# Patient Record
Sex: FEMALE | Race: WHITE | NOT HISPANIC OR LATINO | Employment: FULL TIME | URBAN - METROPOLITAN AREA
[De-identification: names, ages, dates, MRNs, and addresses within clinical notes are randomized per-mention and may not be internally consistent; named-entity substitution may affect disease eponyms.]

---
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
## 2024-01-29 ENCOUNTER — HOSPITAL ENCOUNTER (EMERGENCY)
Facility: MEDICAL CENTER | Age: 19
End: 2024-01-29
Attending: STUDENT IN AN ORGANIZED HEALTH CARE EDUCATION/TRAINING PROGRAM

## 2024-01-29 VITALS
BODY MASS INDEX: 18.11 KG/M2 | SYSTOLIC BLOOD PRESSURE: 102 MMHG | DIASTOLIC BLOOD PRESSURE: 76 MMHG | WEIGHT: 102.2 LBS | TEMPERATURE: 98.4 F | HEART RATE: 87 BPM | RESPIRATION RATE: 16 BRPM | OXYGEN SATURATION: 100 % | HEIGHT: 63 IN

## 2024-01-29 DIAGNOSIS — N30.90 CYSTITIS: ICD-10-CM

## 2024-01-29 LAB
AMORPH CRY #/AREA URNS HPF: PRESENT /HPF
APPEARANCE UR: CLEAR
BACTERIA #/AREA URNS HPF: ABNORMAL /HPF
BILIRUB UR QL STRIP.AUTO: NEGATIVE
COLOR UR: YELLOW
EPI CELLS #/AREA URNS HPF: ABNORMAL /HPF
GLUCOSE UR STRIP.AUTO-MCNC: NEGATIVE MG/DL
HCG UR QL: NEGATIVE
KETONES UR STRIP.AUTO-MCNC: 15 MG/DL
LEUKOCYTE ESTERASE UR QL STRIP.AUTO: NEGATIVE
MICRO URNS: ABNORMAL
NITRITE UR QL STRIP.AUTO: NEGATIVE
PH UR STRIP.AUTO: 7 [PH] (ref 5–8)
PROT UR QL STRIP: NEGATIVE MG/DL
RBC # URNS HPF: ABNORMAL /HPF
RBC UR QL AUTO: ABNORMAL
SP GR UR STRIP.AUTO: 1.01
WBC #/AREA URNS HPF: ABNORMAL /HPF

## 2024-01-29 PROCEDURE — 81025 URINE PREGNANCY TEST: CPT

## 2024-01-29 PROCEDURE — 99283 EMERGENCY DEPT VISIT LOW MDM: CPT

## 2024-01-29 PROCEDURE — 87086 URINE CULTURE/COLONY COUNT: CPT

## 2024-01-29 PROCEDURE — 81001 URINALYSIS AUTO W/SCOPE: CPT

## 2024-01-29 PROCEDURE — 700102 HCHG RX REV CODE 250 W/ 637 OVERRIDE(OP): Performed by: STUDENT IN AN ORGANIZED HEALTH CARE EDUCATION/TRAINING PROGRAM

## 2024-01-29 PROCEDURE — A9270 NON-COVERED ITEM OR SERVICE: HCPCS | Performed by: STUDENT IN AN ORGANIZED HEALTH CARE EDUCATION/TRAINING PROGRAM

## 2024-01-29 RX ORDER — SULFAMETHOXAZOLE AND TRIMETHOPRIM 800; 160 MG/1; MG/1
1 TABLET ORAL 2 TIMES DAILY
Qty: 6 TABLET | Refills: 0 | Status: ACTIVE | OUTPATIENT
Start: 2024-01-29 | End: 2024-02-01

## 2024-01-29 RX ORDER — PHENAZOPYRIDINE HYDROCHLORIDE 200 MG/1
200 TABLET, FILM COATED ORAL 3 TIMES DAILY PRN
Qty: 6 TABLET | Refills: 0 | Status: SHIPPED | OUTPATIENT
Start: 2024-01-29

## 2024-01-29 RX ORDER — SULFAMETHOXAZOLE AND TRIMETHOPRIM 800; 160 MG/1; MG/1
1 TABLET ORAL ONCE
Status: COMPLETED | OUTPATIENT
Start: 2024-01-29 | End: 2024-01-29

## 2024-01-29 RX ADMIN — SULFAMETHOXAZOLE AND TRIMETHOPRIM 1 TABLET: 800; 160 TABLET ORAL at 21:17

## 2024-01-29 ASSESSMENT — FIBROSIS 4 INDEX: FIB4 SCORE: 0.29

## 2024-01-30 NOTE — ED TRIAGE NOTES
"Chief Complaint   Patient presents with    Painful Urination     Onset this morning, pt also noted blood in urine today. Denies any fever, N/V. Reports right flank pain 4 days ago but this has since resolved.      /65   Pulse 94   Temp 36.9 °C (98.4 °F) (Temporal)   Resp 16   Ht 1.6 m (5' 3\")   Wt 46.4 kg (102 lb 3.2 oz)   LMP 01/08/2024 (Within Weeks)   SpO2 96%   BMI 18.10 kg/m²     Pt AO4, amb w steady gait.   "

## 2024-01-30 NOTE — DISCHARGE INSTRUCTIONS
Take the antibiotics until they are gone, may use the Pyridium as needed for increasing urinary frequency, or bladder discomfort, though this may change her urine orange this is normal and will return to normal after a few days.  If you develop any fevers, flank pain uncontrolled vomiting return for recheck

## 2024-01-30 NOTE — ED NOTES
PT provided with DC paper work and follow up care. Pt declines questions about new mediations. Pt to ambulate out of ER.

## 2024-01-30 NOTE — ED PROVIDER NOTES
CHIEF COMPLAINT  Chief Complaint   Patient presents with    Painful Urination     Onset this morning, pt also noted blood in urine today. Denies any fever, N/V. Reports right flank pain 4 days ago but this has since resolved.        LIMITATION TO HISTORY   Select: None    NIMCO Spencer is a 18 y.o. female who presents to the Emergency Department presents for evaluation of 1 week of increasing urinary frequency urgency with noted hematuria beginning today.  No fevers nausea vomiting did have a right flank pain about 4 days ago but this is since resolved.  He is complaining of suprapubic fullness, though no specific abdominal or flank pain    OUTSIDE HISTORIAN(S):  Select: None    EXTERNAL RECORDS REVIEWED  Select: Other ED note from Jose Antonio Mccartney 7/7/2023, patient was evaluated for the fever and headache, does intermittently use marijuana, is otherwise usual and healthy      PAST MEDICAL HISTORY  History reviewed. No pertinent past medical history.  .    SURGICAL HISTORY  History reviewed. No pertinent surgical history.      FAMILY HISTORY  History reviewed. No pertinent family history.       SOCIAL HISTORY  Social History     Socioeconomic History    Marital status: Single     Spouse name: Not on file    Number of children: Not on file    Years of education: Not on file    Highest education level: Not on file   Occupational History    Not on file   Tobacco Use    Smoking status: Never     Passive exposure: Yes    Smokeless tobacco: Never   Vaping Use    Vaping Use: Every day    Substances: Nicotine    Devices: Pre-filled or refillable cartridge   Substance and Sexual Activity    Alcohol use: Not Currently    Drug use: Not Currently    Sexual activity: Not on file   Other Topics Concern    Not on file   Social History Narrative    Not on file     Social Determinants of Health     Financial Resource Strain: Not on file   Food Insecurity: Not on file   Transportation Needs: Not on file   Physical Activity: Not on  "file   Stress: Not on file   Social Connections: Not on file   Intimate Partner Violence: Not on file   Housing Stability: Not on file         CURRENT MEDICATIONS  No current facility-administered medications on file prior to encounter.     Current Outpatient Medications on File Prior to Encounter   Medication Sig Dispense Refill    busPIRone (BUSPAR) 10 MG Tab tablet Take 10 mg by mouth 2 times a day.      ondansetron (ZOFRAN ODT) 4 MG TABLET DISPERSIBLE Take 1 Tablet by mouth every 8 hours as needed for Nausea. 10 Tablet 1    naproxen (NAPROSYN) 500 MG Tab Take 1 Tablet by mouth 2 times daily with meals as needed (pain). 20 Tablet 0    Aspirin-Acetaminophen-Caffeine (EXCEDRIN PO) Take  by mouth.      Pseudoeph-Doxylamine-DM-APAP (NYQUIL PO) Take  by mouth.             ALLERGIES  No Known Allergies    PHYSICAL EXAM  VITAL SIGNS:/76   Pulse 87   Temp 36.9 °C (98.4 °F) (Temporal)   Resp 16   Ht 1.6 m (5' 3\")   Wt 46.4 kg (102 lb 3.2 oz)   LMP 01/08/2024 (Within Weeks)   SpO2 100%   BMI 18.10 kg/m²       VITALS - vital signs documented prior to this note have been reviewed and noted,  see EHR  GENERAL - awake and alert, no acute distress  HEENT - normocephalic, atraumatic, moist mucus membranes  CARDIOVASCULAR - regular rate and rhythm  PULMONARY - unlabored, no respiratory distress. No audible wheezing or  stridor.    Abdomen: Bowel sounds present normal active negative Zamora sign no McBurney's point tenderness no rebound guarding or peritonitis no CVA tenderness no palpable abdominal masses no overlying abrasions lesions or contusions  NEUROLOGIC - mental status normal, speech fluid, cognition normal  MUSCULOSKELETAL -no obvious deformity or swelling  DERMATOLOGIC - warm and dry, no visible rashes  PSYCHIATRIC - normal affect, normal concentration      DIAGNOSTIC STUDIES / PROCEDURES    LABS  Labs Reviewed   URINALYSIS,CULTURE IF INDICATED - Abnormal; Notable for the following components:       Result " Value    Ketones 15 (*)     Occult Blood Moderate (*)     All other components within normal limits    Narrative:     Indication for culture:->Patient WITHOUT an indwelling Salter                  catheter in place with new onset of Dysuria, Frequency,                  Urgency, and/or Suprapubic pain   URINE MICROSCOPIC (W/UA) - Abnormal; Notable for the following components:    WBC 10-20 (*)     RBC 2-5 (*)     Bacteria Few (*)     All other components within normal limits    Narrative:     Indication for culture:->Patient WITHOUT an indwelling Salter                  catheter in place with new onset of Dysuria, Frequency,                  Urgency, and/or Suprapubic pain   HCG QUALITATIVE UR    Narrative:     Indication for culture:->Patient WITHOUT an indwelling Salter                  catheter in place with new onset of Dysuria, Frequency,                  Urgency, and/or Suprapubic pain   URINE CULTURE(NEW)    Narrative:     Indication for culture:->Patient WITHOUT an indwelling Salter                  catheter in place with new onset of Dysuria, Frequency,                  Urgency, and/or Suprapubic pain         Urinalysis does show a 10-20 WBCs with few bacteria concerning for UTI      Radiologist interpretation:   No orders to display        COURSE & MEDICAL DECISION MAKING    ED COURSE:    ED Observation Status? no    INTERVENTIONS BY ME:  Medications   sulfamethoxazole-trimethoprim (Bactrim DS) 800-160 MG tablet 1 Tablet (1 Tablet Oral Given 1/29/24 2117)       INITIAL ASSESSMENT, COURSE AND PLAN  Care Narrative: Patient presented for evaluation of increasing urinary frequency urgency.  No fevers no CVA tenderness, urinalysis does have 10-20 WBCs with few bacteria concerning for UTI.  No fevers no flank pain no CVA tenderness at this point lowering concern for pyelonephritis.  History physical exam seems consistent with acute cystitis she was treated with a dose of Bactrim will be discharged on the same.   Discharge             ADDITIONAL PROBLEM LIST    DISPOSITION AND DISCUSSIONS      Escalation of care considered, and ultimately not performed:blood analysis consider blood work to evaluate for underlying sepsis syndrome given that she reported flank pain a few days ago though she is quite well-appearing nontoxic, thus will defer at this time    Barriers to care at this time, including but not limited to: Patient does not have established PCP.     Decision tools and prescription drugs considered including, but not limited to: Antibiotics   .    FINAL DIAGNOSIS  1. Cystitis             Electronically signed by: Toni Ramos DO ,11:02 PM 01/29/24

## 2024-02-01 LAB
BACTERIA UR CULT: NORMAL
SIGNIFICANT IND 70042: NORMAL
SITE SITE: NORMAL
SOURCE SOURCE: NORMAL

## 2024-06-13 ENCOUNTER — APPOINTMENT (OUTPATIENT)
Dept: RADIOLOGY | Facility: IMAGING CENTER | Age: 19
End: 2024-06-13
Payer: COMMERCIAL

## 2024-06-13 ENCOUNTER — APPOINTMENT (OUTPATIENT)
Dept: OBGYN | Facility: CLINIC | Age: 19
End: 2024-06-13
Payer: COMMERCIAL

## 2024-06-13 DIAGNOSIS — N91.2 AMENORRHEA: ICD-10-CM

## 2024-06-13 PROCEDURE — 76801 OB US < 14 WKS SINGLE FETUS: CPT | Mod: TC | Performed by: NURSE PRACTITIONER

## 2024-06-18 ENCOUNTER — HOSPITAL ENCOUNTER (OUTPATIENT)
Facility: MEDICAL CENTER | Age: 19
End: 2024-06-18
Attending: NURSE PRACTITIONER
Payer: COMMERCIAL

## 2024-06-18 ENCOUNTER — INITIAL PRENATAL (OUTPATIENT)
Dept: OBGYN | Facility: CLINIC | Age: 19
End: 2024-06-18
Payer: COMMERCIAL

## 2024-06-18 VITALS — SYSTOLIC BLOOD PRESSURE: 84 MMHG | WEIGHT: 111.6 LBS | BODY MASS INDEX: 19.77 KG/M2 | DIASTOLIC BLOOD PRESSURE: 42 MMHG

## 2024-06-18 DIAGNOSIS — Z34.01 ENCOUNTER FOR PRENATAL CARE IN FIRST TRIMESTER OF FIRST PREGNANCY: ICD-10-CM

## 2024-06-18 DIAGNOSIS — Z72.0 TOBACCO USE: ICD-10-CM

## 2024-06-18 DIAGNOSIS — F31.9 BIPOLAR 1 DISORDER (HCC): ICD-10-CM

## 2024-06-18 DIAGNOSIS — Z34.82 ENCOUNTER FOR SUPERVISION OF OTHER NORMAL PREGNANCY, SECOND TRIMESTER: ICD-10-CM

## 2024-06-18 DIAGNOSIS — Z86.59 HISTORY OF DEPRESSION: ICD-10-CM

## 2024-06-18 PROBLEM — F12.90 MARIJUANA USE: Status: ACTIVE | Noted: 2024-06-18

## 2024-06-18 LAB
ABO GROUP BLD: NORMAL
BLD GP AB SCN SERPL QL: NORMAL
ERYTHROCYTE [DISTWIDTH] IN BLOOD BY AUTOMATED COUNT: 45.4 FL (ref 35.9–50)
HBV SURFACE AG SER QL: ABNORMAL
HCT VFR BLD AUTO: 38.6 % (ref 37–47)
HCV AB SER QL: ABNORMAL
HGB BLD-MCNC: 12.7 G/DL (ref 12–16)
HIV 1+2 AB+HIV1 P24 AG SERPL QL IA: NORMAL
MCH RBC QN AUTO: 30.8 PG (ref 27–33)
MCHC RBC AUTO-ENTMCNC: 32.9 G/DL (ref 32.2–35.5)
MCV RBC AUTO: 93.7 FL (ref 81.4–97.8)
PLATELET # BLD AUTO: 258 K/UL (ref 164–446)
PMV BLD AUTO: 9.9 FL (ref 9–12.9)
RBC # BLD AUTO: 4.12 M/UL (ref 4.2–5.4)
RH BLD: NORMAL
RUBV AB SER QL: 24.6 IU/ML
T PALLIDUM AB SER QL IA: ABNORMAL
WBC # BLD AUTO: 6.5 K/UL (ref 4.8–10.8)

## 2024-06-18 PROCEDURE — 80307 DRUG TEST PRSMV CHEM ANLYZR: CPT

## 2024-06-18 PROCEDURE — 0500F INITIAL PRENATAL CARE VISIT: CPT | Performed by: NURSE PRACTITIONER

## 2024-06-18 PROCEDURE — 86850 RBC ANTIBODY SCREEN: CPT

## 2024-06-18 PROCEDURE — 87660 TRICHOMONAS VAGIN DIR PROBE: CPT

## 2024-06-18 PROCEDURE — 36415 COLL VENOUS BLD VENIPUNCTURE: CPT

## 2024-06-18 PROCEDURE — G0480 DRUG TEST DEF 1-7 CLASSES: HCPCS

## 2024-06-18 PROCEDURE — 87480 CANDIDA DNA DIR PROBE: CPT

## 2024-06-18 PROCEDURE — 87340 HEPATITIS B SURFACE AG IA: CPT

## 2024-06-18 PROCEDURE — 87389 HIV-1 AG W/HIV-1&-2 AB AG IA: CPT

## 2024-06-18 PROCEDURE — 87491 CHLMYD TRACH DNA AMP PROBE: CPT

## 2024-06-18 PROCEDURE — 87510 GARDNER VAG DNA DIR PROBE: CPT

## 2024-06-18 PROCEDURE — 87591 N.GONORRHOEAE DNA AMP PROB: CPT

## 2024-06-18 PROCEDURE — 86901 BLOOD TYPING SEROLOGIC RH(D): CPT

## 2024-06-18 PROCEDURE — 86780 TREPONEMA PALLIDUM: CPT

## 2024-06-18 PROCEDURE — 85027 COMPLETE CBC AUTOMATED: CPT

## 2024-06-18 PROCEDURE — 3074F SYST BP LT 130 MM HG: CPT | Performed by: NURSE PRACTITIONER

## 2024-06-18 PROCEDURE — 86900 BLOOD TYPING SEROLOGIC ABO: CPT

## 2024-06-18 PROCEDURE — 86762 RUBELLA ANTIBODY: CPT

## 2024-06-18 PROCEDURE — 87086 URINE CULTURE/COLONY COUNT: CPT

## 2024-06-18 PROCEDURE — 86803 HEPATITIS C AB TEST: CPT

## 2024-06-18 PROCEDURE — 3078F DIAST BP <80 MM HG: CPT | Performed by: NURSE PRACTITIONER

## 2024-06-18 RX ORDER — ONDANSETRON 4 MG/1
4 TABLET, ORALLY DISINTEGRATING ORAL EVERY 6 HOURS PRN
Qty: 30 TABLET | Refills: 3 | Status: SHIPPED | OUTPATIENT
Start: 2024-06-18

## 2024-06-18 ASSESSMENT — EDINBURGH POSTNATAL DEPRESSION SCALE (EPDS)
TOTAL SCORE: 5
THE THOUGHT OF HARMING MYSELF HAS OCCURRED TO ME: NEVER
I HAVE FELT SAD OR MISERABLE: NOT VERY OFTEN
I HAVE LOOKED FORWARD WITH ENJOYMENT TO THINGS: AS MUCH AS I EVER DID
I HAVE BEEN ABLE TO LAUGH AND SEE THE FUNNY SIDE OF THINGS: AS MUCH AS I ALWAYS COULD
I HAVE BEEN SO UNHAPPY THAT I HAVE BEEN CRYING: ONLY OCCASIONALLY
I HAVE BLAMED MYSELF UNNECESSARILY WHEN THINGS WENT WRONG: NO, NEVER
I HAVE BEEN ANXIOUS OR WORRIED FOR NO GOOD REASON: HARDLY EVER
THINGS HAVE BEEN GETTING ON TOP OF ME: NO, I HAVE BEEN COPING AS WELL AS EVER
I HAVE FELT SCARED OR PANICKY FOR NO GOOD REASON: YES, SOMETIMES
I HAVE BEEN SO UNHAPPY THAT I HAVE HAD DIFFICULTY SLEEPING: NOT AT ALL

## 2024-06-18 ASSESSMENT — FIBROSIS 4 INDEX: FIB4 SCORE: 0.29

## 2024-06-18 NOTE — PROGRESS NOTES
Subjective:   Juju Spencer is a 18 y.o.  who presents for her new OB exam.  She is 12w1d with an TAZ of Estimated Date of Delivery: 24 by US. She is feeling well but sore breasts. Denies VB, LOF, contractions or pain. No ER visits or previous care in this pregnancy. Denies dysuria, vaginal DC, fever. Reports no fetal movement. Declines AFP.  Declines CF.  Desires NIPT testing.     History reviewed. No pertinent past medical history.    Psych Hx: Reports previous diagnosis of bipolar, depression, anxiety, stopped meds in .  Patient denies any other hx PTSD, bipolar or any other psychological issues.     EPDS completed    History reviewed. No pertinent surgical history.     OB History    Para Term  AB Living   1             SAB IAB Ectopic Molar Multiple Live Births                    # Outcome Date GA Lbr Albino/2nd Weight Sex Delivery Anes PTL Lv   1 Current                 Gynecological Hx: Hx of GC/CT and trich. Denies any hx of STIs, including HSV. Denies any vulvovaginal disorders and no hx of abnormal cervical cytology. Last pap n/a.     Sexual Hx: One current male partner, who is FOB.     Contraceptive Hx: Has used depo and pills in the past and has since discontinued use.     Family History   Problem Relation Age of Onset    No Known Problems Mother     No Known Problems Father     No Known Problems Sister     No Known Problems Brother     Cancer Maternal Grandmother         colon    Lung Disease Maternal Grandfather     No Known Problems Paternal Grandmother     No Known Problems Paternal Grandfather      Denies any genetic disorders in family history.     Social History     Socioeconomic History    Marital status: Single     Spouse name: Not on file    Number of children: Not on file    Years of education: Not on file    Highest education level: Not on file   Occupational History    Not on file   Tobacco Use    Smoking status: Never     Passive exposure: Yes    Smokeless tobacco:  Never   Vaping Use    Vaping status: Every Day    Substances: Nicotine    Devices: Pre-filled or refillable cartridge   Substance and Sexual Activity    Alcohol use: Not Currently     Comment: socially    Drug use: Not Currently    Sexual activity: Yes     Partners: Male     Birth control/protection: Pill, Injection     Comment: Last dose of Depo injection in 2020 and used BC pills 2022   Other Topics Concern    Not on file   Social History Narrative    Not on file     Social Determinants of Health     Financial Resource Strain: Not on file   Food Insecurity: Not on file   Transportation Needs: Not on file   Physical Activity: Not on file   Stress: Not on file   Social Connections: Not on file   Intimate Partner Violence: Not on file   Housing Stability: Not on file       FOB is involved and lives with Juju Spencer.  Pregnancy is unplanned but desired.    She is currently not working, denies any heavy lifting or exposure to potential teratogens like environmental or occupational toxins.   Reports has cut back on vaping tobacco use, marijuana use on and off, is planning to cut back.   Denies any current or hx of sexual, emotional or physical abuse or trauma.     Current Medications: PNV   Allergies: Denies allergies to medications, food, or environmental allergies    Objective:      Vitals:    06/18/24 0943   BP: (!) 84/42   Weight: 111 lb 9.6 oz        See Prenatal Physical and Prenatal Vitals      Assessment:      1.  IUP @ 12w1d per US      2.  S=D      3.  See problem list as follows       Patient Active Problem List    Diagnosis Date Noted    History of depression and anxiety 06/18/2024    Encounter for prenatal care in first trimester of first pregnancy 06/18/2024    Bipolar 06/18/2024         Plan:   - GC/CT/vaginal pathogens self collected   - Declines mental health referrals at this time, will continue to monitor  - NIPT ordered   - Reviewed nicotine and marijuana cessation   - Prenatal labs ordered - lab  slip given  - Discussed PNV, nutrition, adequate water intake, and exercise/weight gain in pregnancy  - NOB informational packet with anticipatory guidance given  - Inappropriate for [facility] Pregnancy, pt desires to try groups  - S/sx of pregnancy warning signs and PTL precautions given  - Complete OB US in 8 wks  - Return to [facility] in 4 wks

## 2024-06-18 NOTE — PROGRESS NOTES
Pt. Here for NOB visit.  # [phone] (home)   LMP 3/12/2024  Last pap smear N/A  U/S done on 6/13/2024  Pt. States having some nausea in Am.   EPDS = 5  Pt desires NIPT's testing.

## 2024-06-19 ENCOUNTER — TELEPHONE (OUTPATIENT)
Dept: OBGYN | Facility: CLINIC | Age: 19
End: 2024-06-19
Payer: COMMERCIAL

## 2024-06-19 LAB
C TRACH DNA GENITAL QL NAA+PROBE: NEGATIVE
CANDIDA DNA VAG QL PROBE+SIG AMP: NEGATIVE
G VAGINALIS DNA VAG QL PROBE+SIG AMP: POSITIVE
N GONORRHOEA DNA GENITAL QL NAA+PROBE: NEGATIVE
SPECIMEN SOURCE: NORMAL
T VAGINALIS DNA VAG QL PROBE+SIG AMP: NEGATIVE

## 2024-06-19 RX ORDER — METRONIDAZOLE 500 MG/1
500 TABLET ORAL EVERY 8 HOURS
Qty: 14 TABLET | Refills: 0 | Status: SHIPPED | OUTPATIENT
Start: 2024-06-19

## 2024-06-19 NOTE — TELEPHONE ENCOUNTER
----- Message from AKSHAT Escalona sent at 6/19/2024 10:43 AM PDT -----  Rx for BV in pregnancy sent.     6/19/2024 1128  Called pt and informed her test came back positive for BV, explained this is not an STI. Pt informed she needs to start antibiotics. Should take the full Tx and advised to take meds with food but not with milk and to avoid alcohol and intercourse while on Tx. Pharmacy verified with pt. Pt agreed and verbalized understanding.

## 2024-06-20 LAB
AMPHET CTO UR CFM-MCNC: NEGATIVE NG/ML
BACTERIA UR CULT: NORMAL
BARBITURATES CTO UR CFM-MCNC: NEGATIVE NG/ML
BENZODIAZ CTO UR CFM-MCNC: NEGATIVE NG/ML
CANNABINOIDS CTO UR CFM-MCNC: NORMAL NG/ML
COCAINE CTO UR CFM-MCNC: NEGATIVE NG/ML
CREAT UR-MCNC: 80 MG/DL (ref 20–400)
DRUG COMMENT 753798: NORMAL
METHADONE CTO UR CFM-MCNC: NEGATIVE NG/ML
OPIATES CTO UR CFM-MCNC: NEGATIVE NG/ML
PCP CTO UR CFM-MCNC: NEGATIVE NG/ML
PROPOXYPH CTO UR CFM-MCNC: NEGATIVE NG/ML
SIGNIFICANT IND 70042: NORMAL
SITE SITE: NORMAL
SOURCE SOURCE: NORMAL

## 2024-06-23 LAB — THC UR CFM-MCNC: >500 NG/ML

## 2024-07-17 ENCOUNTER — ROUTINE PRENATAL (OUTPATIENT)
Dept: OBGYN | Facility: CLINIC | Age: 19
End: 2024-07-17
Payer: COMMERCIAL

## 2024-07-17 ENCOUNTER — HOSPITAL ENCOUNTER (OUTPATIENT)
Facility: MEDICAL CENTER | Age: 19
End: 2024-07-17
Attending: OBSTETRICS & GYNECOLOGY
Payer: COMMERCIAL

## 2024-07-17 VITALS — DIASTOLIC BLOOD PRESSURE: 59 MMHG | WEIGHT: 116.2 LBS | BODY MASS INDEX: 20.58 KG/M2 | SYSTOLIC BLOOD PRESSURE: 104 MMHG

## 2024-07-17 DIAGNOSIS — N89.8 VAGINA ITCHING: ICD-10-CM

## 2024-07-17 DIAGNOSIS — Z34.02 ENCOUNTER FOR SUPERVISION OF NORMAL FIRST PREGNANCY IN SECOND TRIMESTER: Primary | ICD-10-CM

## 2024-07-17 PROCEDURE — 87660 TRICHOMONAS VAGIN DIR PROBE: CPT

## 2024-07-17 PROCEDURE — 87480 CANDIDA DNA DIR PROBE: CPT

## 2024-07-17 PROCEDURE — 3074F SYST BP LT 130 MM HG: CPT | Performed by: OBSTETRICS & GYNECOLOGY

## 2024-07-17 PROCEDURE — 0502F SUBSEQUENT PRENATAL CARE: CPT | Performed by: OBSTETRICS & GYNECOLOGY

## 2024-07-17 PROCEDURE — 3078F DIAST BP <80 MM HG: CPT | Performed by: OBSTETRICS & GYNECOLOGY

## 2024-07-17 PROCEDURE — 87510 GARDNER VAG DNA DIR PROBE: CPT

## 2024-07-17 ASSESSMENT — FIBROSIS 4 INDEX: FIB4 SCORE: 0.3

## 2024-07-18 LAB
CANDIDA DNA VAG QL PROBE+SIG AMP: NEGATIVE
G VAGINALIS DNA VAG QL PROBE+SIG AMP: NEGATIVE
T VAGINALIS DNA VAG QL PROBE+SIG AMP: NEGATIVE

## 2024-08-09 ENCOUNTER — ROUTINE PRENATAL (OUTPATIENT)
Dept: OBGYN | Facility: CLINIC | Age: 19
End: 2024-08-09
Payer: COMMERCIAL

## 2024-08-09 VITALS — BODY MASS INDEX: 21.68 KG/M2 | DIASTOLIC BLOOD PRESSURE: 68 MMHG | SYSTOLIC BLOOD PRESSURE: 119 MMHG | WEIGHT: 122.4 LBS

## 2024-08-09 DIAGNOSIS — Z34.01 ENCOUNTER FOR PRENATAL CARE IN FIRST TRIMESTER OF FIRST PREGNANCY: ICD-10-CM

## 2024-08-09 PROCEDURE — 0502F SUBSEQUENT PRENATAL CARE: CPT | Performed by: MIDWIFE

## 2024-08-09 ASSESSMENT — FIBROSIS 4 INDEX: FIB4 SCORE: 0.3

## 2024-08-09 NOTE — PROGRESS NOTES
S: Pt is a 18 y.o.  at 19w4d gestation here today for centering session #1    Concerns today include:  Denies concerns. Says her mood is okay. Denies needing to start medication or therapy at this time. She is getting creative at improving her mood like spending more time outside.    Denies: vaginal bleeding, pelvic and abdominal pain, cramping, contractions, leaking of fluid, urinary and vaginal symptoms    Pt reports fetal movement as Present     O: LMP 2024 (Approximate)    *see prenatal flowsheet*     Labs:     Prenatal labs: Completed and normal  GCT: not yet collected   GBS: Not yet collected  Genetic testing: NIPT low risk female  STI testing: negative    Ultrasound: none since last visit    A/P:     Problem List Items Addressed This Visit       Encounter for prenatal care in first trimester of first pregnancy     Pt is a 18 y.o.  at 19w4d gestation here today for routine prenatal care.   Size equal to dates    Discuss 2nd trimester warning signs  Anatomy scan scheduled.   Centering Topics reviewed: Nutrition and exercise in pregnancy. Common discomforts of pregnancy. Identifying goals for pregnancy care and labor education   RTC 4 wks

## 2024-08-09 NOTE — ASSESSMENT & PLAN NOTE
Pt is a 18 y.o.  at 19w4d gestation here today for routine prenatal care.   Size equal to dates    Discuss 2nd trimester warning signs  Anatomy scan scheduled.   Centering Topics reviewed: Nutrition and exercise in pregnancy. Common discomforts of pregnancy. Identifying goals for pregnancy care and labor education   RTC 4 wks

## 2024-08-14 ENCOUNTER — APPOINTMENT (OUTPATIENT)
Dept: RADIOLOGY | Facility: IMAGING CENTER | Age: 19
End: 2024-08-14
Attending: NURSE PRACTITIONER
Payer: COMMERCIAL

## 2024-08-15 ENCOUNTER — APPOINTMENT (OUTPATIENT)
Dept: OBGYN | Facility: CLINIC | Age: 19
End: 2024-08-15
Attending: NURSE PRACTITIONER
Payer: COMMERCIAL

## 2024-08-15 VITALS
DIASTOLIC BLOOD PRESSURE: 67 MMHG | HEART RATE: 78 BPM | BODY MASS INDEX: 22.07 KG/M2 | WEIGHT: 124.6 LBS | SYSTOLIC BLOOD PRESSURE: 102 MMHG

## 2024-08-15 DIAGNOSIS — Z34.01 ENCOUNTER FOR PRENATAL CARE IN FIRST TRIMESTER OF FIRST PREGNANCY: ICD-10-CM

## 2024-08-15 PROCEDURE — 76805 OB US >/= 14 WKS SNGL FETUS: CPT | Performed by: OBSTETRICS & GYNECOLOGY

## 2024-08-15 ASSESSMENT — FIBROSIS 4 INDEX: FIB4 SCORE: 0.3

## 2024-09-05 ENCOUNTER — TELEPHONE (OUTPATIENT)
Dept: OBGYN | Facility: CLINIC | Age: 19
End: 2024-09-05
Payer: COMMERCIAL

## 2024-09-05 ENCOUNTER — ROUTINE PRENATAL (OUTPATIENT)
Dept: OBGYN | Facility: CLINIC | Age: 19
End: 2024-09-05
Payer: COMMERCIAL

## 2024-09-05 VITALS — DIASTOLIC BLOOD PRESSURE: 58 MMHG | SYSTOLIC BLOOD PRESSURE: 116 MMHG | BODY MASS INDEX: 22.6 KG/M2 | WEIGHT: 127.6 LBS

## 2024-09-05 DIAGNOSIS — Z34.02 ENCOUNTER FOR SUPERVISION OF NORMAL FIRST PREGNANCY IN SECOND TRIMESTER: ICD-10-CM

## 2024-09-05 PROCEDURE — 3078F DIAST BP <80 MM HG: CPT

## 2024-09-05 PROCEDURE — 0502F SUBSEQUENT PRENATAL CARE: CPT

## 2024-09-05 PROCEDURE — 3074F SYST BP LT 130 MM HG: CPT

## 2024-09-05 RX ORDER — ASPIRIN 81 MG/1
81 TABLET, CHEWABLE ORAL DAILY
COMMUNITY

## 2024-09-05 ASSESSMENT — FIBROSIS 4 INDEX: FIB4 SCORE: 0.3

## 2024-09-05 NOTE — PROGRESS NOTES
Pt here for a OB follow up   GA: 23w 3d   Pt states: cramping on the right side.   + fetal movement.  No VB, LOF, UC's.  Phone # [phone]   Pharmacy Verified.

## 2024-09-05 NOTE — TELEPHONE ENCOUNTER
09/05 LVM FOR PT TO CALL BACK, NEEDS TO SCHEDULE AN OB F/U. CENTERING WILL BE CXL AS PROVIDER OUT [email or web address]

## 2024-09-06 NOTE — PROGRESS NOTES
S:  Pt is  at 23w3d for routine OB follow up. She is having round lower abdominal pain when getting up from sitting to standing, more on the right than left. No ED or hospital visits since last seen. Reports good FM. Denies VB, LOF, RUCs or vaginal DC.    O:  See above for vitals            A:  IUP at 23w3d  Patient Active Problem List    Diagnosis Date Noted    History of depression and anxiety 2024    Encounter for prenatal care in first trimester of first pregnancy 2024    Bipolar 2024    Marijuana use 2024        P:  1.  Nutrition/diet discussed.        2.  Questions answered.          3.  Encourage adequate water intake.        4.   labor precautions reviewed.         5.  1 hr GTT, CBC and T pallidum ordered. Instructions and lab slip given to pt.        6.  F/u 4 wks for next Centering visit.

## 2024-09-23 ENCOUNTER — HOSPITAL ENCOUNTER (OUTPATIENT)
Dept: LAB | Facility: MEDICAL CENTER | Age: 19
End: 2024-09-23
Payer: COMMERCIAL

## 2024-09-23 DIAGNOSIS — Z34.02 ENCOUNTER FOR SUPERVISION OF NORMAL FIRST PREGNANCY IN SECOND TRIMESTER: ICD-10-CM

## 2024-09-23 LAB
BASOPHILS # BLD AUTO: 0.3 % (ref 0–1.8)
BASOPHILS # BLD: 0.03 K/UL (ref 0–0.12)
EOSINOPHIL # BLD AUTO: 0.26 K/UL (ref 0–0.51)
EOSINOPHIL NFR BLD: 2.4 % (ref 0–6.9)
ERYTHROCYTE [DISTWIDTH] IN BLOOD BY AUTOMATED COUNT: 47.8 FL (ref 35.9–50)
HCT VFR BLD AUTO: 37.4 % (ref 37–47)
HGB BLD-MCNC: 11.9 G/DL (ref 12–16)
IMM GRANULOCYTES # BLD AUTO: 0.09 K/UL (ref 0–0.11)
IMM GRANULOCYTES NFR BLD AUTO: 0.8 % (ref 0–0.9)
LYMPHOCYTES # BLD AUTO: 1.67 K/UL (ref 1–4.8)
LYMPHOCYTES NFR BLD: 15.1 % (ref 22–41)
MCH RBC QN AUTO: 30.7 PG (ref 27–33)
MCHC RBC AUTO-ENTMCNC: 31.8 G/DL (ref 32.2–35.5)
MCV RBC AUTO: 96.4 FL (ref 81.4–97.8)
MONOCYTES # BLD AUTO: 0.47 K/UL (ref 0–0.85)
MONOCYTES NFR BLD AUTO: 4.3 % (ref 0–13.4)
NEUTROPHILS # BLD AUTO: 8.52 K/UL (ref 1.82–7.42)
NEUTROPHILS NFR BLD: 77.1 % (ref 44–72)
NRBC # BLD AUTO: 0 K/UL
NRBC BLD-RTO: 0 /100 WBC (ref 0–0.2)
PLATELET # BLD AUTO: 256 K/UL (ref 164–446)
PMV BLD AUTO: 10.2 FL (ref 9–12.9)
RBC # BLD AUTO: 3.88 M/UL (ref 4.2–5.4)
WBC # BLD AUTO: 11 K/UL (ref 4.8–10.8)

## 2024-09-23 PROCEDURE — 86780 TREPONEMA PALLIDUM: CPT

## 2024-09-23 PROCEDURE — 36415 COLL VENOUS BLD VENIPUNCTURE: CPT

## 2024-09-23 PROCEDURE — 85025 COMPLETE CBC W/AUTO DIFF WBC: CPT

## 2024-09-23 PROCEDURE — 82950 GLUCOSE TEST: CPT

## 2024-09-24 ENCOUNTER — ROUTINE PRENATAL (OUTPATIENT)
Dept: OBGYN | Facility: CLINIC | Age: 19
End: 2024-09-24
Payer: COMMERCIAL

## 2024-09-24 VITALS — BODY MASS INDEX: 24.09 KG/M2 | DIASTOLIC BLOOD PRESSURE: 60 MMHG | SYSTOLIC BLOOD PRESSURE: 112 MMHG | WEIGHT: 136 LBS

## 2024-09-24 DIAGNOSIS — Z34.02 ENCOUNTER FOR SUPERVISION OF NORMAL FIRST PREGNANCY IN SECOND TRIMESTER: ICD-10-CM

## 2024-09-24 DIAGNOSIS — Z34.01 ENCOUNTER FOR PRENATAL CARE IN FIRST TRIMESTER OF FIRST PREGNANCY: ICD-10-CM

## 2024-09-24 LAB
GLUCOSE 1H P 50 G GLC PO SERPL-MCNC: 101 MG/DL (ref 70–139)
T PALLIDUM AB SER QL IA: NORMAL

## 2024-09-24 PROCEDURE — 3074F SYST BP LT 130 MM HG: CPT

## 2024-09-24 PROCEDURE — 0502F SUBSEQUENT PRENATAL CARE: CPT

## 2024-09-24 PROCEDURE — 3078F DIAST BP <80 MM HG: CPT

## 2024-09-24 NOTE — PROGRESS NOTES
S: Pt is a 18 y.o.  at 26w1d gestation here today for routine prenatal care.     Concerns today include:  Denies concerns    Denies: vaginal bleeding, pelvic and abdominal pain, cramping, contractions, leaking of fluid, urinary and vaginal symptoms    Pt reports fetal movement as Present     O: /60   Wt 136 lb   LMP 2024 (Approximate)   BMI 24.09 kg/m²    *see prenatal flowsheet*     Labs:     Prenatal labs: Completed and normal except positive cannabinoids   GCT: completed yesterday, results pending    GBS: Not yet collected  Genetic testing: NIPT low risk female   STI testing: negative    Ultrasound: 8/15/24 WNL    A/P:     Problem List Items Addressed This Visit       Encounter for prenatal care in first trimester of first pregnancy     Pt is a 18 y.o.  at 26w1d gestation here today for routine prenatal care.   Size equal to dates    Discuss 2nd trimester warning signs  Address concerns: denies concerns today  MSAFP declined   AFP tetra declined. NIPT performed earlier this pregnancy.  Anatomy scan reviewed. Findings: WNL.   3rd trimester labs completed yesterday, results of GTT and T. Pallidium pending, CBC WNL  RTC 4 wks            Other Visit Diagnoses       Encounter for supervision of normal first pregnancy in second trimester

## 2024-09-24 NOTE — ASSESSMENT & PLAN NOTE
Pt is a 18 y.o.  at 26w1d gestation here today for routine prenatal care.   Size equal to dates    Discuss 2nd trimester warning signs  Address concerns: denies concerns today  MSAFP declined   AFP tetra declined. NIPT performed earlier this pregnancy.  Anatomy scan reviewed. Findings: WNL.   3rd trimester labs completed yesterday, results of GTT and T. Pallidium pending, CBC WNL  RTC 4 wks

## 2024-09-24 NOTE — PROGRESS NOTES
Pt here for a OB follow up   GA: 26w 1d   Pt states: no questions or concerns   + fetal movement.  No VB, LOF, UC's.  Phone # [phone]   Pharmacy Verified.

## 2024-10-04 ENCOUNTER — ROUTINE PRENATAL (OUTPATIENT)
Dept: OBGYN | Facility: CLINIC | Age: 19
End: 2024-10-04
Payer: COMMERCIAL

## 2024-10-04 ENCOUNTER — HOSPITAL ENCOUNTER (OUTPATIENT)
Facility: MEDICAL CENTER | Age: 19
End: 2024-10-04
Attending: MIDWIFE
Payer: COMMERCIAL

## 2024-10-04 VITALS — WEIGHT: 133.8 LBS | DIASTOLIC BLOOD PRESSURE: 65 MMHG | BODY MASS INDEX: 23.7 KG/M2 | SYSTOLIC BLOOD PRESSURE: 121 MMHG

## 2024-10-04 DIAGNOSIS — F12.90 MARIJUANA USE: ICD-10-CM

## 2024-10-04 DIAGNOSIS — Z86.59 HISTORY OF DEPRESSION: ICD-10-CM

## 2024-10-04 DIAGNOSIS — Z34.00 SUPERVISION OF NORMAL FIRST PREGNANCY, ANTEPARTUM: ICD-10-CM

## 2024-10-04 DIAGNOSIS — Z34.02 ENCOUNTER FOR PRENATAL CARE IN SECOND TRIMESTER OF FIRST PREGNANCY: ICD-10-CM

## 2024-10-04 DIAGNOSIS — F31.9 BIPOLAR 1 DISORDER (HCC): ICD-10-CM

## 2024-10-04 DIAGNOSIS — R30.0 DYSURIA: ICD-10-CM

## 2024-10-04 LAB
APPEARANCE UR: CLEAR
BILIRUB UR STRIP-MCNC: NEGATIVE MG/DL
COLOR UR AUTO: YELLOW
GLUCOSE UR STRIP.AUTO-MCNC: NEGATIVE MG/DL
KETONES UR STRIP.AUTO-MCNC: NORMAL MG/DL
LEUKOCYTE ESTERASE UR QL STRIP.AUTO: NEGATIVE
NITRITE UR QL STRIP.AUTO: NEGATIVE
PH UR STRIP.AUTO: 7 [PH] (ref 5–8)
PROT UR QL STRIP: NEGATIVE MG/DL
RBC UR QL AUTO: NEGATIVE
SP GR UR STRIP.AUTO: 1.01
UROBILINOGEN UR STRIP-MCNC: NORMAL MG/DL

## 2024-10-04 PROCEDURE — 0502F SUBSEQUENT PRENATAL CARE: CPT | Performed by: MIDWIFE

## 2024-10-04 PROCEDURE — 3078F DIAST BP <80 MM HG: CPT | Performed by: MIDWIFE

## 2024-10-04 PROCEDURE — 81002 URINALYSIS NONAUTO W/O SCOPE: CPT | Performed by: MIDWIFE

## 2024-10-04 PROCEDURE — 87086 URINE CULTURE/COLONY COUNT: CPT

## 2024-10-04 PROCEDURE — 90715 TDAP VACCINE 7 YRS/> IM: CPT | Mod: JZ | Performed by: MIDWIFE

## 2024-10-04 PROCEDURE — 3074F SYST BP LT 130 MM HG: CPT | Performed by: MIDWIFE

## 2024-10-04 PROCEDURE — 90471 IMMUNIZATION ADMIN: CPT | Performed by: MIDWIFE

## 2024-10-06 LAB
BACTERIA UR CULT: NORMAL
SIGNIFICANT IND 70042: NORMAL
SITE SITE: NORMAL
SOURCE SOURCE: NORMAL

## 2024-10-22 ENCOUNTER — ROUTINE PRENATAL (OUTPATIENT)
Dept: OBGYN | Facility: CLINIC | Age: 19
End: 2024-10-22
Payer: COMMERCIAL

## 2024-10-22 VITALS — SYSTOLIC BLOOD PRESSURE: 100 MMHG | DIASTOLIC BLOOD PRESSURE: 54 MMHG | WEIGHT: 140.2 LBS | BODY MASS INDEX: 24.84 KG/M2

## 2024-10-22 DIAGNOSIS — Z34.93 PRENATAL CARE, THIRD TRIMESTER: ICD-10-CM

## 2024-10-22 PROCEDURE — 3078F DIAST BP <80 MM HG: CPT

## 2024-10-22 PROCEDURE — 0502F SUBSEQUENT PRENATAL CARE: CPT

## 2024-10-22 PROCEDURE — 3074F SYST BP LT 130 MM HG: CPT

## 2024-11-01 ENCOUNTER — ROUTINE PRENATAL (OUTPATIENT)
Dept: OBGYN | Facility: CLINIC | Age: 19
End: 2024-11-01
Payer: COMMERCIAL

## 2024-11-01 VITALS — BODY MASS INDEX: 24.55 KG/M2 | DIASTOLIC BLOOD PRESSURE: 69 MMHG | WEIGHT: 138.6 LBS | SYSTOLIC BLOOD PRESSURE: 116 MMHG

## 2024-11-01 DIAGNOSIS — Z34.02 ENCOUNTER FOR PRENATAL CARE IN SECOND TRIMESTER OF FIRST PREGNANCY: ICD-10-CM

## 2024-11-01 PROCEDURE — 3074F SYST BP LT 130 MM HG: CPT | Performed by: MIDWIFE

## 2024-11-01 PROCEDURE — 3078F DIAST BP <80 MM HG: CPT | Performed by: MIDWIFE

## 2024-11-01 PROCEDURE — 0502F SUBSEQUENT PRENATAL CARE: CPT | Performed by: MIDWIFE

## 2024-11-01 NOTE — ASSESSMENT & PLAN NOTE
Pt is a 18 y.o.  at 31w4d gestation here today for centering prenatal care, session 5.   Size equal to dates    Discuss  labor and pre-eclampsia precautions.  Discuss FMA and FKCs  Discuss Nutrition, hydration and exercise,  labor signs and symptoms, Labor signs and symptoms, and Choosing a pediatrician  Address concerns: None  GBS Not yet collected  Rh positive  Tdap vaccine: Administered prior visit    Flu vaccine:  Declined  Reviewed 3rd tri labs  Labs ordered: none  Imaging ordered: none  RTC 2 wks.

## 2024-11-01 NOTE — PROGRESS NOTES
S: Pt is a 18 y.o.  at 31w4d gestation here today for centering session #5    Concerns today include:  Denies concerns. Flu vaccine declined.    Denies: vaginal bleeding, pelvic and abdominal pain, cramping, contractions, leaking of fluid, urinary and vaginal symptoms and headaches, visual changes, epigastric pain    Pt reports fetal movement as Present     O: /69   Wt 138 lb 9.6 oz   LMP 2024 (Approximate)   BMI 24.55 kg/m²    *see prenatal flowsheet*     Labs:     Prenatal labs: Completed and normal  GCT: 101  GBS: Not yet collected  Genetic testing: NIPT low risk  STI testing: negative    Ultrasound: none since last visit    A/P:     Problem List Items Addressed This Visit          High    Encounter for prenatal care in second trimester of first pregnancy     Pt is a 18 y.o.  at 31w4d gestation here today for centering prenatal care, session 5.   Size equal to dates    Discuss  labor and pre-eclampsia precautions.  Discuss FMA and FKCs  Discuss Nutrition, hydration and exercise,  labor signs and symptoms, Labor signs and symptoms, and Choosing a pediatrician  Address concerns: None  GBS Not yet collected  Rh positive  Tdap vaccine: Administered prior visit    Flu vaccine:  Declined  Reviewed 3rd tri labs  Labs ordered: none  Imaging ordered: none  RTC 2 wks.

## 2024-11-14 ENCOUNTER — ROUTINE PRENATAL (OUTPATIENT)
Dept: OBGYN | Facility: CLINIC | Age: 19
End: 2024-11-14
Payer: COMMERCIAL

## 2024-11-14 VITALS — BODY MASS INDEX: 26.08 KG/M2 | DIASTOLIC BLOOD PRESSURE: 68 MMHG | WEIGHT: 147.2 LBS | SYSTOLIC BLOOD PRESSURE: 102 MMHG

## 2024-11-14 DIAGNOSIS — Z34.93 PRENATAL CARE, THIRD TRIMESTER: ICD-10-CM

## 2024-11-14 PROCEDURE — 3074F SYST BP LT 130 MM HG: CPT

## 2024-11-14 PROCEDURE — 0502F SUBSEQUENT PRENATAL CARE: CPT

## 2024-11-14 PROCEDURE — 3078F DIAST BP <80 MM HG: CPT

## 2024-11-14 NOTE — ASSESSMENT & PLAN NOTE
Pt is a 18 y.o.  at 33w3d gestation here today for routine prenatal care.   Size equal to dates    Discuss  labor and pre-eclampsia precautions.  Discuss FMA and FKCs  Address concerns: fetal presentation - discussed expected time for vertex presentation. IOL - no indication for medical IOL at this time, discussed elective IOL process.   GBS Not yet collected. Plan to collect at 36 weeks discussed.   Rh positive  Tdap: Administered 10/4/24  Labs ordered: none  Imaging ordered: none  RTC 2 wks.

## 2024-11-14 NOTE — PROGRESS NOTES
S: Pt is a 18 y.o.  at 33w3d gestation here today for routine prenatal care.     Concerns today include:  Denies concerns. Wondering when baby should be head down and if she will need IOL.     Denies: vaginal bleeding, pelvic and abdominal pain, cramping, contractions, leaking of fluid, urinary and vaginal symptoms and headaches, visual changes, epigastric pain    Pt reports fetal movement as Present     O: /68   Wt 147 lb 3.2 oz   LMP 2024 (Approximate)   BMI 26.08 kg/m²    *see prenatal flowsheet*     Labs:     Prenatal labs: Completed and normal  GCT: 101   GBS: Not yet collected  Genetic testing: NIPT low risk female  STI testing: negative    Ultrasound: no US since last visit     A/P:     Problem List Items Addressed This Visit          Women's Health Problems    Prenatal care, third trimester     Pt is a 18 y.o.  at 33w3d gestation here today for routine prenatal care.   Size equal to dates    Discuss  labor and pre-eclampsia precautions.  Discuss FMA and FKCs  Address concerns: fetal presentation - discussed expected time for vertex presentation. IOL - no indication for medical IOL at this time, discussed elective IOL process.   GBS Not yet collected. Plan to collect at 36 weeks discussed.   Rh positive  Tdap: Administered 10/4/24  Labs ordered: none  Imaging ordered: none  RTC 2 wks.

## 2024-11-14 NOTE — PROGRESS NOTES
Pt here for a OB follow up   GA: 33w 3d  Pt states: wants to know if baby is head down and wants to talk about induction.   + fetal movement.  No VB, LOF, UC's.  Phone # [phone]   Pharmacy Verified.

## 2024-12-06 ENCOUNTER — ROUTINE PRENATAL (OUTPATIENT)
Dept: OBGYN | Facility: CLINIC | Age: 19
End: 2024-12-06
Payer: COMMERCIAL

## 2024-12-06 ENCOUNTER — HOSPITAL ENCOUNTER (OUTPATIENT)
Facility: MEDICAL CENTER | Age: 19
End: 2024-12-06
Attending: MIDWIFE
Payer: COMMERCIAL

## 2024-12-06 VITALS — DIASTOLIC BLOOD PRESSURE: 70 MMHG | WEIGHT: 154.2 LBS | SYSTOLIC BLOOD PRESSURE: 115 MMHG | BODY MASS INDEX: 27.32 KG/M2

## 2024-12-06 DIAGNOSIS — Z34.02 ENCOUNTER FOR PRENATAL CARE IN SECOND TRIMESTER OF FIRST PREGNANCY: ICD-10-CM

## 2024-12-06 DIAGNOSIS — F31.9 BIPOLAR 1 DISORDER (HCC): ICD-10-CM

## 2024-12-06 DIAGNOSIS — F12.90 MARIJUANA USE: ICD-10-CM

## 2024-12-06 DIAGNOSIS — Z86.59 HISTORY OF DEPRESSION: ICD-10-CM

## 2024-12-06 PROCEDURE — 3078F DIAST BP <80 MM HG: CPT | Performed by: MIDWIFE

## 2024-12-06 PROCEDURE — 3074F SYST BP LT 130 MM HG: CPT | Performed by: MIDWIFE

## 2024-12-06 PROCEDURE — 87081 CULTURE SCREEN ONLY: CPT

## 2024-12-06 PROCEDURE — 87150 DNA/RNA AMPLIFIED PROBE: CPT

## 2024-12-06 PROCEDURE — 0502F SUBSEQUENT PRENATAL CARE: CPT | Performed by: MIDWIFE

## 2024-12-06 NOTE — PROGRESS NOTES
S: Pt is a 18 y.o.  at 36w4d gestation here today for centering session #6    Concerns today include:  Denies concerns    Denies: vaginal bleeding, pelvic and abdominal pain, cramping, contractions, leaking of fluid, urinary and vaginal symptoms    Pt reports fetal movement as Present     O: /70   Wt 154 lb 3.2 oz   LMP 2024 (Approximate)   BMI 27.32 kg/m²    *see prenatal flowsheet*     Labs:     Prenatal labs: Completed and normal  GCT: 101   GBS: Collected today  Genetic testing: NIPT LR  STI testing: negative    Ultrasound: none since last visit    A/P:     Problem List Items Addressed This Visit          High    Encounter for prenatal care in second trimester of first pregnancy     Pt is a 18 y.o.  at 36w4d gestation here today for centering prenatal care, session 6.   Size equal to dates    Discuss Labor and pre-eclampsia precautions.  Discuss FMA and FKCs  Discuss  labor signs and symptoms, labor pain management, and Labor signs and symptoms  Address concerns: none  GBS Collected today  Rh positive  Tdap vaccine: Administered prior visit    Labs ordered: GBS  Imaging ordered: none  RTC 1 wks.          Relevant Orders    GRP B STREP, BY PCR (YORK BROTH)       Unprioritized    History of depression and anxiety    Bipolar    Marijuana use

## 2024-12-08 LAB — GP B STREP DNA SPEC QL NAA+PROBE: NEGATIVE

## 2024-12-12 ENCOUNTER — ROUTINE PRENATAL (OUTPATIENT)
Dept: OBGYN | Facility: CLINIC | Age: 19
End: 2024-12-12
Payer: COMMERCIAL

## 2024-12-12 VITALS — WEIGHT: 154 LBS | DIASTOLIC BLOOD PRESSURE: 60 MMHG | BODY MASS INDEX: 27.28 KG/M2 | SYSTOLIC BLOOD PRESSURE: 100 MMHG

## 2024-12-12 DIAGNOSIS — Z34.93 PRENATAL CARE, THIRD TRIMESTER: ICD-10-CM

## 2024-12-12 PROCEDURE — 3074F SYST BP LT 130 MM HG: CPT

## 2024-12-12 PROCEDURE — 0502F SUBSEQUENT PRENATAL CARE: CPT

## 2024-12-12 PROCEDURE — 3078F DIAST BP <80 MM HG: CPT

## 2024-12-12 NOTE — ASSESSMENT & PLAN NOTE
Pt is a 18 y.o.  at 37w3d gestation here today for routine prenatal care.   Size equal to dates    Discuss Labor and pre-eclampsia precautions.  Discuss FMA and FKCs  Address concerns: denies concerns.   GBS Negative.  Rh positive  Tdap: Administered 10/4/24  Reviewed 3rd tri labs: yes  Labs ordered: none  Imaging ordered: none  RTC 1 wk.

## 2024-12-12 NOTE — PROGRESS NOTES
S: Pt is a 18 y.o.  at 37w3d gestation here today for routine prenatal care.     Concerns today include:  Denies concerns    Denies: vaginal bleeding, pelvic and abdominal pain, cramping, contractions, leaking of fluid, urinary and vaginal symptoms and headaches, visual changes, epigastric pain    Pt reports fetal movement as Present     O: /60   Wt 154 lb   LMP 2024 (Approximate)   BMI 27.28 kg/m²    *see prenatal flowsheet*     Labs:     Prenatal labs: Completed and normal  GCT: 101   GBS: Negative.  Genetic testing: NIPT low risk female   STI testing: negative    Ultrasound: none since last visit     A/P:     Problem List Items Addressed This Visit       Prenatal care, third trimester     Pt is a 18 y.o.  at 37w3d gestation here today for routine prenatal care.   Size equal to dates    Discuss Labor and pre-eclampsia precautions.  Discuss FMA and FKCs  Address concerns: denies concerns.   GBS Negative.  Rh positive  Tdap: Administered 10/4/24  Reviewed 3rd tri labs: yes  Labs ordered: none  Imaging ordered: none  RTC 1 wk.

## 2024-12-12 NOTE — PROGRESS NOTES
Pt here for a OB follow up   GA: 37w 3d    Pt states: no questions or concerns   + fetal movement.  No VB, LOF, UC's.  Phone # [phone]   Pharmacy Verified.  Flu Vaccine: declined  GBS : negative   Tdap : 10/04

## 2024-12-20 ENCOUNTER — ROUTINE PRENATAL (OUTPATIENT)
Dept: OBGYN | Facility: CLINIC | Age: 19
End: 2024-12-20
Payer: COMMERCIAL

## 2024-12-20 VITALS — WEIGHT: 151.4 LBS | SYSTOLIC BLOOD PRESSURE: 111 MMHG | BODY MASS INDEX: 26.82 KG/M2 | DIASTOLIC BLOOD PRESSURE: 73 MMHG

## 2024-12-20 DIAGNOSIS — Z34.02 ENCOUNTER FOR PRENATAL CARE IN SECOND TRIMESTER OF FIRST PREGNANCY: ICD-10-CM

## 2024-12-20 DIAGNOSIS — Z86.59 HISTORY OF DEPRESSION: ICD-10-CM

## 2024-12-20 DIAGNOSIS — F12.90 MARIJUANA USE: ICD-10-CM

## 2024-12-20 DIAGNOSIS — F31.9 BIPOLAR 1 DISORDER (HCC): ICD-10-CM

## 2024-12-20 PROCEDURE — 3074F SYST BP LT 130 MM HG: CPT | Performed by: MIDWIFE

## 2024-12-20 PROCEDURE — 0502F SUBSEQUENT PRENATAL CARE: CPT | Performed by: MIDWIFE

## 2024-12-20 PROCEDURE — 3078F DIAST BP <80 MM HG: CPT | Performed by: MIDWIFE

## 2024-12-20 NOTE — PROGRESS NOTES
S: Pt is a 19 y.o.  at 38w4d gestation here today for centering session #7    Concerns today include:  Denies concerns    Denies: vaginal bleeding, pelvic and abdominal pain, cramping, contractions, leaking of fluid, urinary and vaginal symptoms    Pt reports fetal movement as Present     O: /73   Wt 151 lb 6.4 oz   LMP 2024 (Approximate)   BMI 26.82 kg/m²    *see prenatal flowsheet*     Labs:     Prenatal labs: Completed and normal  GCT: 101   GBS: Negative.  Genetic testing: NIPT LR  STI testing: negative    Ultrasound: none since last visit    A/P:     Problem List Items Addressed This Visit          High    Encounter for prenatal care in second trimester of first pregnancy     Pt is a 19 y.o.  at 38w4d gestation here today for centering prenatal care, session 7.   Size equal to dates    Discuss Labor and pre-eclampsia precautions.  Discuss FMA and FKCs  Discuss breastfeeding,  medications, [place] care, Choosing a pediatrician, Postpartum warning signs, and  warning signs  Address concerns: None  GBS Negative.  Rh positive  Tdap vaccine: Administered prior visit    Flu vaccine: Declined  Labs ordered: none  Imaging ordered: none  RTC 1 wks.             Unprioritized    History of depression and anxiety    Bipolar    Marijuana use

## 2024-12-20 NOTE — ASSESSMENT & PLAN NOTE
Pt is a 19 y.o.  at 38w4d gestation here today for centering prenatal care, session 7.   Size equal to dates    Discuss Labor and pre-eclampsia precautions.  Discuss FMA and FKCs  Discuss breastfeeding, [place] medications, [place] care, Choosing a pediatrician, Postpartum warning signs, and  warning signs  Address concerns: None  GBS Negative.  Rh positive  Tdap vaccine: Administered prior visit    Flu vaccine: Declined  Labs ordered: none  Imaging ordered: none  RTC 1 wks.

## 2024-12-21 ENCOUNTER — HOSPITAL ENCOUNTER (INPATIENT)
Facility: MEDICAL CENTER | Age: 19
LOS: 2 days | End: 2024-12-24
Attending: OBSTETRICS & GYNECOLOGY | Admitting: OBSTETRICS & GYNECOLOGY
Payer: COMMERCIAL

## 2024-12-21 PROCEDURE — 99284 EMERGENCY DEPT VISIT MOD MDM: CPT

## 2024-12-21 ASSESSMENT — PAIN SCALES - GENERAL: PAINLEVEL: 3

## 2024-12-22 ENCOUNTER — ANESTHESIA EVENT (OUTPATIENT)
Dept: ANESTHESIOLOGY | Facility: MEDICAL CENTER | Age: 19
End: 2024-12-22
Payer: COMMERCIAL

## 2024-12-22 ENCOUNTER — ANESTHESIA (OUTPATIENT)
Dept: ANESTHESIOLOGY | Facility: MEDICAL CENTER | Age: 19
End: 2024-12-22
Payer: COMMERCIAL

## 2024-12-22 LAB
APPEARANCE UR: CLEAR
BASOPHILS # BLD AUTO: 0.3 % (ref 0–1.8)
BASOPHILS # BLD: 0.03 K/UL (ref 0–0.12)
BILIRUB UR QL STRIP.AUTO: NEGATIVE
COLOR UR AUTO: YELLOW
EOSINOPHIL # BLD AUTO: 0.14 K/UL (ref 0–0.51)
EOSINOPHIL NFR BLD: 1.2 % (ref 0–6.9)
ERYTHROCYTE [DISTWIDTH] IN BLOOD BY AUTOMATED COUNT: 43.8 FL (ref 35.9–50)
GLUCOSE UR QL STRIP.AUTO: NEGATIVE MG/DL
HCT VFR BLD AUTO: 35.2 % (ref 37–47)
HGB BLD-MCNC: 11.7 G/DL (ref 12–16)
HOLDING TUBE BB 8507: NORMAL
IMM GRANULOCYTES # BLD AUTO: 0.08 K/UL (ref 0–0.11)
IMM GRANULOCYTES NFR BLD AUTO: 0.7 % (ref 0–0.9)
KETONES UR QL STRIP.AUTO: NEGATIVE MG/DL
LEUKOCYTE ESTERASE UR QL STRIP.AUTO: NEGATIVE
LYMPHOCYTES # BLD AUTO: 2.2 K/UL (ref 1–4.8)
LYMPHOCYTES NFR BLD: 18.7 % (ref 22–41)
MCH RBC QN AUTO: 30.6 PG (ref 27–33)
MCHC RBC AUTO-ENTMCNC: 33.2 G/DL (ref 32.2–35.5)
MCV RBC AUTO: 92.1 FL (ref 81.4–97.8)
MONOCYTES # BLD AUTO: 0.61 K/UL (ref 0–0.85)
MONOCYTES NFR BLD AUTO: 5.2 % (ref 0–13.4)
NEUTROPHILS # BLD AUTO: 8.73 K/UL (ref 1.82–7.42)
NEUTROPHILS NFR BLD: 73.9 % (ref 44–72)
NITRITE UR QL STRIP.AUTO: NEGATIVE
NRBC # BLD AUTO: 0 K/UL
NRBC BLD-RTO: 0 /100 WBC (ref 0–0.2)
PH UR STRIP.AUTO: 6 [PH] (ref 5–8)
PLATELET # BLD AUTO: 225 K/UL (ref 164–446)
PMV BLD AUTO: 10.8 FL (ref 9–12.9)
PROT UR QL STRIP: NEGATIVE MG/DL
RBC # BLD AUTO: 3.82 M/UL (ref 4.2–5.4)
RBC UR QL AUTO: ABNORMAL
SP GR UR STRIP.AUTO: 1.01 (ref 1–1.03)
T PALLIDUM AB SER QL IA: NORMAL
UROBILINOGEN UR STRIP.AUTO-MCNC: 0.2 MG/DL
WBC # BLD AUTO: 11.8 K/UL (ref 4.8–10.8)

## 2024-12-22 PROCEDURE — 700111 HCHG RX REV CODE 636 W/ 250 OVERRIDE (IP): Performed by: OBSTETRICS & GYNECOLOGY

## 2024-12-22 PROCEDURE — 85025 COMPLETE CBC W/AUTO DIFF WBC: CPT

## 2024-12-22 PROCEDURE — 770002 HCHG ROOM/CARE - OB PRIVATE (112)

## 2024-12-22 PROCEDURE — 86780 TREPONEMA PALLIDUM: CPT

## 2024-12-22 PROCEDURE — 700102 HCHG RX REV CODE 250 W/ 637 OVERRIDE(OP)

## 2024-12-22 PROCEDURE — 59400 OBSTETRICAL CARE: CPT

## 2024-12-22 PROCEDURE — 59409 OBSTETRICAL CARE: CPT

## 2024-12-22 PROCEDURE — 700105 HCHG RX REV CODE 258: Performed by: OBSTETRICS & GYNECOLOGY

## 2024-12-22 PROCEDURE — 81002 URINALYSIS NONAUTO W/O SCOPE: CPT

## 2024-12-22 PROCEDURE — 303615 HCHG EPIDURAL/SPINAL ANESTHESIA FOR LABOR

## 2024-12-22 PROCEDURE — 700105 HCHG RX REV CODE 258: Performed by: ADVANCED PRACTICE MIDWIFE

## 2024-12-22 PROCEDURE — 700101 HCHG RX REV CODE 250: Performed by: STUDENT IN AN ORGANIZED HEALTH CARE EDUCATION/TRAINING PROGRAM

## 2024-12-22 PROCEDURE — 36415 COLL VENOUS BLD VENIPUNCTURE: CPT

## 2024-12-22 PROCEDURE — 700111 HCHG RX REV CODE 636 W/ 250 OVERRIDE (IP): Performed by: ADVANCED PRACTICE MIDWIFE

## 2024-12-22 PROCEDURE — A9270 NON-COVERED ITEM OR SERVICE: HCPCS

## 2024-12-22 PROCEDURE — 700105 HCHG RX REV CODE 258: Performed by: ANESTHESIOLOGY

## 2024-12-22 PROCEDURE — 700111 HCHG RX REV CODE 636 W/ 250 OVERRIDE (IP): Performed by: ANESTHESIOLOGY

## 2024-12-22 PROCEDURE — 304965 HCHG RECOVERY SERVICES

## 2024-12-22 PROCEDURE — 0HQ9XZZ REPAIR PERINEUM SKIN, EXTERNAL APPROACH: ICD-10-PCS

## 2024-12-22 PROCEDURE — 700111 HCHG RX REV CODE 636 W/ 250 OVERRIDE (IP): Performed by: STUDENT IN AN ORGANIZED HEALTH CARE EDUCATION/TRAINING PROGRAM

## 2024-12-22 RX ORDER — SODIUM CHLORIDE, SODIUM LACTATE, POTASSIUM CHLORIDE, AND CALCIUM CHLORIDE .6; .31; .03; .02 G/100ML; G/100ML; G/100ML; G/100ML
250 INJECTION, SOLUTION INTRAVENOUS PRN
Status: DISCONTINUED | OUTPATIENT
Start: 2024-12-22 | End: 2024-12-22

## 2024-12-22 RX ORDER — EPHEDRINE SULFATE 50 MG/ML
5 INJECTION, SOLUTION INTRAVENOUS
Status: DISCONTINUED | OUTPATIENT
Start: 2024-12-22 | End: 2024-12-22

## 2024-12-22 RX ORDER — SODIUM CHLORIDE, SODIUM LACTATE, POTASSIUM CHLORIDE, AND CALCIUM CHLORIDE .6; .31; .03; .02 G/100ML; G/100ML; G/100ML; G/100ML
1000 INJECTION, SOLUTION INTRAVENOUS
Status: COMPLETED | OUTPATIENT
Start: 2024-12-22 | End: 2024-12-22

## 2024-12-22 RX ORDER — OXYTOCIN 10 [USP'U]/ML
10 INJECTION, SOLUTION INTRAMUSCULAR; INTRAVENOUS
Status: DISCONTINUED | OUTPATIENT
Start: 2024-12-22 | End: 2024-12-22 | Stop reason: HOSPADM

## 2024-12-22 RX ORDER — TERBUTALINE SULFATE 1 MG/ML
0.25 INJECTION, SOLUTION SUBCUTANEOUS
Status: DISCONTINUED | OUTPATIENT
Start: 2024-12-22 | End: 2024-12-22 | Stop reason: HOSPADM

## 2024-12-22 RX ORDER — ONDANSETRON 2 MG/ML
4 INJECTION INTRAMUSCULAR; INTRAVENOUS EVERY 6 HOURS PRN
Status: DISCONTINUED | OUTPATIENT
Start: 2024-12-22 | End: 2024-12-22 | Stop reason: HOSPADM

## 2024-12-22 RX ORDER — IBUPROFEN 800 MG/1
800 TABLET, FILM COATED ORAL EVERY 8 HOURS PRN
Status: DISCONTINUED | OUTPATIENT
Start: 2024-12-22 | End: 2024-12-24 | Stop reason: HOSPADM

## 2024-12-22 RX ORDER — HYDROXYZINE HYDROCHLORIDE 50 MG/1
50 TABLET, FILM COATED ORAL EVERY 6 HOURS PRN
Status: DISCONTINUED | OUTPATIENT
Start: 2024-12-22 | End: 2024-12-22 | Stop reason: HOSPADM

## 2024-12-22 RX ORDER — DEXMEDETOMIDINE HYDROCHLORIDE 100 UG/ML
INJECTION, SOLUTION INTRAVENOUS PRN
Status: DISCONTINUED | OUTPATIENT
Start: 2024-12-22 | End: 2024-12-22 | Stop reason: SURG

## 2024-12-22 RX ORDER — SODIUM CHLORIDE, SODIUM LACTATE, POTASSIUM CHLORIDE, AND CALCIUM CHLORIDE .6; .31; .03; .02 G/100ML; G/100ML; G/100ML; G/100ML
1000 INJECTION, SOLUTION INTRAVENOUS
Status: DISCONTINUED | OUTPATIENT
Start: 2024-12-22 | End: 2024-12-22

## 2024-12-22 RX ORDER — LIDOCAINE HYDROCHLORIDE 10 MG/ML
20 INJECTION, SOLUTION INFILTRATION; PERINEURAL
Status: DISCONTINUED | OUTPATIENT
Start: 2024-12-22 | End: 2024-12-22 | Stop reason: HOSPADM

## 2024-12-22 RX ORDER — IBUPROFEN 800 MG/1
800 TABLET, FILM COATED ORAL
Status: DISCONTINUED | OUTPATIENT
Start: 2024-12-22 | End: 2024-12-22 | Stop reason: HOSPADM

## 2024-12-22 RX ORDER — ROPIVACAINE HYDROCHLORIDE 2 MG/ML
INJECTION, SOLUTION EPIDURAL; INFILTRATION
Status: COMPLETED | OUTPATIENT
Start: 2024-12-22 | End: 2024-12-22

## 2024-12-22 RX ORDER — ONDANSETRON 4 MG/1
4 TABLET, ORALLY DISINTEGRATING ORAL EVERY 6 HOURS PRN
Status: DISCONTINUED | OUTPATIENT
Start: 2024-12-22 | End: 2024-12-22 | Stop reason: HOSPADM

## 2024-12-22 RX ORDER — ACETAMINOPHEN 500 MG
1000 TABLET ORAL
Status: DISCONTINUED | OUTPATIENT
Start: 2024-12-22 | End: 2024-12-22 | Stop reason: HOSPADM

## 2024-12-22 RX ORDER — SODIUM CHLORIDE, SODIUM LACTATE, POTASSIUM CHLORIDE, AND CALCIUM CHLORIDE .6; .31; .03; .02 G/100ML; G/100ML; G/100ML; G/100ML
250 INJECTION, SOLUTION INTRAVENOUS PRN
Status: DISCONTINUED | OUTPATIENT
Start: 2024-12-22 | End: 2024-12-22 | Stop reason: HOSPADM

## 2024-12-22 RX ORDER — SODIUM CHLORIDE, SODIUM LACTATE, POTASSIUM CHLORIDE, CALCIUM CHLORIDE 600; 310; 30; 20 MG/100ML; MG/100ML; MG/100ML; MG/100ML
2000 INJECTION, SOLUTION INTRAVENOUS PRN
Status: DISCONTINUED | OUTPATIENT
Start: 2024-12-22 | End: 2024-12-24 | Stop reason: HOSPADM

## 2024-12-22 RX ORDER — ALUMINA, MAGNESIA, AND SIMETHICONE 2400; 2400; 240 MG/30ML; MG/30ML; MG/30ML
30 SUSPENSION ORAL EVERY 6 HOURS PRN
Status: DISCONTINUED | OUTPATIENT
Start: 2024-12-22 | End: 2024-12-22 | Stop reason: HOSPADM

## 2024-12-22 RX ORDER — EPHEDRINE SULFATE 50 MG/ML
5 INJECTION, SOLUTION INTRAVENOUS
Status: DISCONTINUED | OUTPATIENT
Start: 2024-12-22 | End: 2024-12-22 | Stop reason: HOSPADM

## 2024-12-22 RX ORDER — DOCUSATE SODIUM 100 MG/1
100 CAPSULE, LIQUID FILLED ORAL 2 TIMES DAILY PRN
Status: DISCONTINUED | OUTPATIENT
Start: 2024-12-22 | End: 2024-12-24 | Stop reason: HOSPADM

## 2024-12-22 RX ORDER — SODIUM CHLORIDE, SODIUM LACTATE, POTASSIUM CHLORIDE, CALCIUM CHLORIDE 600; 310; 30; 20 MG/100ML; MG/100ML; MG/100ML; MG/100ML
INJECTION, SOLUTION INTRAVENOUS CONTINUOUS
Status: DISCONTINUED | OUTPATIENT
Start: 2024-12-22 | End: 2024-12-24 | Stop reason: HOSPADM

## 2024-12-22 RX ORDER — ROPIVACAINE HYDROCHLORIDE 2 MG/ML
INJECTION, SOLUTION EPIDURAL; INFILTRATION; PERINEURAL CONTINUOUS
Status: DISCONTINUED | OUTPATIENT
Start: 2024-12-22 | End: 2024-12-24 | Stop reason: HOSPADM

## 2024-12-22 RX ORDER — ACETAMINOPHEN 500 MG
1000 TABLET ORAL EVERY 6 HOURS PRN
Status: DISCONTINUED | OUTPATIENT
Start: 2024-12-22 | End: 2024-12-24 | Stop reason: HOSPADM

## 2024-12-22 RX ORDER — LIDOCAINE HYDROCHLORIDE AND EPINEPHRINE 15; 5 MG/ML; UG/ML
INJECTION, SOLUTION EPIDURAL
Status: COMPLETED | OUTPATIENT
Start: 2024-12-22 | End: 2024-12-22

## 2024-12-22 RX ORDER — ROPIVACAINE HYDROCHLORIDE 2 MG/ML
INJECTION, SOLUTION EPIDURAL; INFILTRATION; PERINEURAL CONTINUOUS
Status: DISCONTINUED | OUTPATIENT
Start: 2024-12-22 | End: 2024-12-22

## 2024-12-22 RX ADMIN — ROPIVACAINE HYDROCHLORIDE 5 ML: 2 INJECTION EPIDURAL; INFILTRATION; PERINEURAL at 15:41

## 2024-12-22 RX ADMIN — DEXMEDETOMIDINE HYDROCHLORIDE 5 MCG: 100 INJECTION, SOLUTION INTRAVENOUS at 15:41

## 2024-12-22 RX ADMIN — OXYTOCIN 10 UNITS: 10 INJECTION, SOLUTION INTRAMUSCULAR; INTRAVENOUS at 21:48

## 2024-12-22 RX ADMIN — LIDOCAINE HYDROCHLORIDE,EPINEPHRINE BITARTRATE 5 ML: 15; .005 INJECTION, SOLUTION EPIDURAL; INFILTRATION; INTRACAUDAL; PERINEURAL at 07:43

## 2024-12-22 RX ADMIN — OXYTOCIN 2 MILLI-UNITS/MIN: 10 INJECTION, SOLUTION INTRAMUSCULAR; INTRAVENOUS at 09:40

## 2024-12-22 RX ADMIN — SODIUM CHLORIDE, SODIUM LACTATE, POTASSIUM CHLORIDE, AND CALCIUM CHLORIDE: .6; .31; .03; .02 INJECTION, SOLUTION INTRAVENOUS at 08:18

## 2024-12-22 RX ADMIN — ROPIVACAINE HYDROCHLORIDE: 2 INJECTION, SOLUTION EPIDURAL; INFILTRATION at 15:22

## 2024-12-22 RX ADMIN — ROPIVACAINE HYDROCHLORIDE: 2 INJECTION, SOLUTION EPIDURAL; INFILTRATION at 07:50

## 2024-12-22 RX ADMIN — ROPIVACAINE HYDROCHLORIDE 5 ML: 2 INJECTION EPIDURAL; INFILTRATION; PERINEURAL at 07:49

## 2024-12-22 RX ADMIN — IBUPROFEN 800 MG: 800 TABLET, FILM COATED ORAL at 23:50

## 2024-12-22 RX ADMIN — SODIUM CHLORIDE, SODIUM LACTATE, POTASSIUM CHLORIDE, AND CALCIUM CHLORIDE 1000 ML: .6; .31; .03; .02 INJECTION, SOLUTION INTRAVENOUS at 07:12

## 2024-12-22 SDOH — ECONOMIC STABILITY: TRANSPORTATION INSECURITY
IN THE PAST 12 MONTHS, HAS LACK OF RELIABLE TRANSPORTATION KEPT YOU FROM MEDICAL APPOINTMENTS, MEETINGS, WORK OR FROM GETTING THINGS NEEDED FOR DAILY LIVING?: NO

## 2024-12-22 SDOH — ECONOMIC STABILITY: TRANSPORTATION INSECURITY
IN THE PAST 12 MONTHS, HAS THE LACK OF TRANSPORTATION KEPT YOU FROM MEDICAL APPOINTMENTS OR FROM GETTING MEDICATIONS?: NO

## 2024-12-22 ASSESSMENT — SOCIAL DETERMINANTS OF HEALTH (SDOH)
WITHIN THE LAST YEAR, HAVE YOU BEEN AFRAID OF YOUR PARTNER OR EX-PARTNER?: NO
WITHIN THE LAST YEAR, HAVE TO BEEN RAPED OR FORCED TO HAVE ANY KIND OF SEXUAL ACTIVITY BY YOUR PARTNER OR EX-PARTNER?: NO
WITHIN THE PAST 12 MONTHS, THE FOOD YOU BOUGHT JUST DIDN'T LAST AND YOU DIDN'T HAVE MONEY TO GET MORE: PATIENT UNABLE TO ANSWER
WITHIN THE LAST YEAR, HAVE YOU BEEN HUMILIATED OR EMOTIONALLY ABUSED IN OTHER WAYS BY YOUR PARTNER OR EX-PARTNER?: NO
WITHIN THE PAST 12 MONTHS, YOU WORRIED THAT YOUR FOOD WOULD RUN OUT BEFORE YOU GOT THE MONEY TO BUY MORE: PATIENT UNABLE TO ANSWER
IN THE PAST 12 MONTHS, HAS THE ELECTRIC, GAS, OIL, OR WATER COMPANY THREATENED TO SHUT OFF SERVICE IN YOUR HOME?: PATIENT UNABLE TO ANSWER
WITHIN THE LAST YEAR, HAVE YOU BEEN KICKED, HIT, SLAPPED, OR OTHERWISE PHYSICALLY HURT BY YOUR PARTNER OR EX-PARTNER?: NO

## 2024-12-22 ASSESSMENT — PAIN DESCRIPTION - PAIN TYPE
TYPE: ACUTE PAIN

## 2024-12-22 ASSESSMENT — LIFESTYLE VARIABLES
HAVE PEOPLE ANNOYED YOU BY CRITICIZING YOUR DRINKING: NO
EVER FELT BAD OR GUILTY ABOUT YOUR DRINKING: NO
EVER HAD A DRINK FIRST THING IN THE MORNING TO STEADY YOUR NERVES TO GET RID OF A HANGOVER: NO
HAVE YOU EVER FELT YOU SHOULD CUT DOWN ON YOUR DRINKING: NO
TOTAL SCORE: 0
TOTAL SCORE: 0
ALCOHOL_USE: NO
AVERAGE NUMBER OF DAYS PER WEEK YOU HAVE A DRINK CONTAINING ALCOHOL: 0
CONSUMPTION TOTAL: NEGATIVE
ON A TYPICAL DAY WHEN YOU DRINK ALCOHOL HOW MANY DRINKS DO YOU HAVE: 0
DOES PATIENT WANT TO STOP DRINKING: NO
HOW MANY TIMES IN THE PAST YEAR HAVE YOU HAD 5 OR MORE DRINKS IN A DAY: 0
TOTAL SCORE: 0

## 2024-12-22 ASSESSMENT — PAIN SCALES - GENERAL: PAIN_LEVEL: 6

## 2024-12-22 ASSESSMENT — PATIENT HEALTH QUESTIONNAIRE - PHQ9
2. FEELING DOWN, DEPRESSED, IRRITABLE, OR HOPELESS: NOT AT ALL
SUM OF ALL RESPONSES TO PHQ9 QUESTIONS 1 AND 2: 0
1. LITTLE INTEREST OR PLEASURE IN DOING THINGS: NOT AT ALL

## 2024-12-22 NOTE — H&P
Admission History and Physical      Juju Spencer is a 19 y.o. female  at 38w6d who presents for vaginal bleedin    Subjective:   Patient was observed in OB ED over a few hours and noted to have continued contractions and cervical change consistent with early labor. Bleeding she initially presented for resolved without intervention and was thought to be consistent with blood show.     ROS: Pertinent items are noted in HPI.    No past medical history on file.  No past surgical history on file.  OB History    Para Term  AB Living   1             SAB IAB Ectopic Molar Multiple Live Births                    # Outcome Date GA Lbr Albino/2nd Weight Sex Type Anes PTL Lv   1 Current              Social History     Socioeconomic History    Marital status: Single     Spouse name: Not on file    Number of children: Not on file    Years of education: Not on file    Highest education level: Not on file   Occupational History    Not on file   Tobacco Use    Smoking status: Never     Passive exposure: Yes    Smokeless tobacco: Never   Vaping Use    Vaping status: Every Day    Substances: Nicotine    Devices: Pre-filled or refillable cartridge   Substance and Sexual Activity    Alcohol use: Not Currently     Comment: socially    Drug use: Not Currently    Sexual activity: Yes     Partners: Male     Birth control/protection: Pill, Injection     Comment: Last dose of Depo injection in  and used BC pills    Other Topics Concern    Not on file   Social History Narrative    Not on file     Social Drivers of Health     Financial Resource Strain: Not on file   Food Insecurity: Not on file   Transportation Needs: Not on file   Physical Activity: Not on file   Stress: Not on file   Social Connections: Not on file   Intimate Partner Violence: Not on file   Housing Stability: Not on file     Allergies: Patient has no known allergies.    Current Facility-Administered Medications:     LR infusion, , Intravenous,  Continuous, Rose Adkins, IRON    lidocaine (XYLOCAINE) 1%  injection, 20 mL, Subcutaneous, Once PRN, IRON Donnelly    terbutaline (Brethine) injection 0.25 mg, 0.25 mg, Subcutaneous, Once PRN, Rose Akdins, OGNP    oxytocin (Pitocin) infusion bolus (for post delivery), 10 Units, Intravenous, Once **FOLLOWED BY** oxytocin (Pitocin) infusion bolus (for post delivery), 10 Units, Intravenous, Once **FOLLOWED BY** oxytocin (Pitocin) infusion (for post delivery), 125 mL/hr, Intravenous, Continuous, IRON Donnelly    oxytocin (Pitocin) injection 10 Units, 10 Units, Intramuscular, Once PRN, IRON Donnelly    ibuprofen (Motrin) tablet 800 mg, 800 mg, Oral, Once PRN, IRON Donnelly    acetaminophen (Tylenol) tablet 1,000 mg, 1,000 mg, Oral, Once PRN, IRON Donnelly    ondansetron (Zofran ODT) dispertab 4 mg, 4 mg, Oral, Q6HRS PRN **OR** ondansetron (Zofran) syringe/vial injection 4 mg, 4 mg, Intravenous, Q6HRS PRN, IRON Donnelly    hydrOXYzine HCl (Atarax) tablet 50 mg, 50 mg, Oral, Q6HRS PRN, Rose Adkins, IRON    mag hydrox-al hydrox-simeth (Maalox Plus Es Or Mylanta Ds) suspension 30 mL, 30 mL, Oral, Q6HRS PRN, Rose Adkins, IRON    fentaNYL (Sublimaze) injection 50 mcg, 50 mcg, Intravenous, Q HOUR PRN **OR** fentaNYL (Sublimaze) injection 100 mcg, 100 mcg, Intravenous, Q HOUR PRN, IRON Donnelly    Prenatal care with [facility] starting at 12 weeks with following problems:  Patient Active Problem List    Diagnosis Date Noted    Labor and delivery indication for care or intervention 12/22/2024    Prenatal care, third trimester 10/22/2024    History of depression and anxiety 06/18/2024    Encounter for prenatal care in second trimester of first pregnancy 06/18/2024    Bipolar 06/18/2024    Marijuana use 06/18/2024       Last US at 20 weeks  Exam date     GA        BPD (mm)       HC (mm)          AC (mm)         FL (mm)        HL (mm)        EFW  "(g)  08/15/2024    20w 3d    47.9    52%    175.9     27%    159.5    65%    31.4    20%    31.6    55%    353     44%    Single viable IUP with biometry consistent with 20w 3d corresponding to an EDC of 12/30/2024.  Growth and anatomy survey appears normal.  Normal amniotic fluid volume        Objective:      /64   Pulse 88   Temp 36.1 °C (97 °F) (Temporal)   Resp 18   Ht 1.6 m (5' 3\")   Wt 68.5 kg (151 lb)   SpO2 97%     General:   no acute distress, alert, cooperative   Skin:   normal   HEENT:  PERRLA   Lungs:   CTA bilateral   Heart:   S1, S2 normal, no murmur, click, rub or gallop, regular rate and rhythm, peripheral pulses very brisk, no pedal edema, no JVD, no hepatosplenomegaly   Abdomen:   gravid, NT   EFW:  3300g   Pelvis:  adequate, diagnonal conjugate not met   FHT:  140 BPM   Uterine Size: S=D   Presentations: Cephalic   Cervix:  Per RN    Dilation: 5    Effacement: 70    Station:  -2    Consistency: Soft    Position: Middle     Lab Review  Lab:   Blood type: A     Recent Results (from the past 35 weeks)   PREG CNTR PRENATAL PN    Collection Time: 06/18/24 10:27 AM   Result Value Ref Range    WBC 6.5 4.8 - 10.8 K/uL    RBC 4.12 (L) 4.20 - 5.40 M/uL    Hemoglobin 12.7 12.0 - 16.0 g/dL    Hematocrit 38.6 37.0 - 47.0 %    MCV 93.7 81.4 - 97.8 fL    MCH 30.8 27.0 - 33.0 pg    MCHC 32.9 32.2 - 35.5 g/dL    RDW 45.4 35.9 - 50.0 fL    Platelet Count 258 164 - 446 K/uL    MPV 9.9 9.0 - 12.9 fL    Hepatitis C Antibody Non-Reactive Non-Reactive    Hepatitis B Surface Antigen Non-Reactive Non-Reactive    Rubella IgG Antibody 24.60 IU/mL    Syphilis, Treponemal Qual Non-Reactive Non-Reactive   URINE DRUG SCREEN W/CONF (AR)    Collection Time: 06/18/24 10:27 AM   Result Value Ref Range    Urine Amphetamine-Methamphetam Negative Cutoff 300 ng/mL    Barbiturates Negative Cutoff 200 ng/mL    Benzodiazepines Negative Cutoff 200 ng/mL    Propoxyphene Negative Cutoff 300 ng/mL    Cocaine Metabolite Negative " Cutoff 150 ng/mL    Methadone Negative Cutoff 150 ng/mL    Codeine-Morphine Negative Cutoff 300 ng/mL    Phencyclidine -Pcp Negative Cutoff 25 ng/mL    Cannabinoid Metab PresumptivePOS Cutoff 50 ng/mL    Creatinine Urine 80.0 20.0 - 400.0 mg/dL    Drug Comment Urine Drugs See Note    URINE CULTURE(NEW)    Collection Time: 06/18/24 10:27 AM    Specimen: Urine   Result Value Ref Range    Significant Indicator NEG     Source UR     Site Clean Catch     Culture Result Usual urogenital mery >100,000 cfu/mL    HIV AG/AB COMBO ASSAY SCREENING    Collection Time: 06/18/24 10:27 AM   Result Value Ref Range    HIV Ag/Ab Combo Assay Non-Reactive Non Reactive   OP Prenatal Panel-Blood Bank    Collection Time: 06/18/24 10:27 AM   Result Value Ref Range    ABO Grouping Only A     Rh Grouping Only POS     Antibody Screen Scrn NEG    CANNABINOIDS, UR CONFIRM    Collection Time: 06/18/24 10:27 AM   Result Value Ref Range    Cannabinoids, Ur Drug Confirmation >500 ng/mL   VAGINAL PATHOGENS DNA PANEL    Collection Time: 06/18/24  3:04 PM   Result Value Ref Range    Candida species DNA Probe Negative Negative    Trichamonas vaginalis DNA Probe Negative Negative    Gardnerella vaginalis DNA Probe POSITIVE (A) Negative   Chlamydia/GC, PCR (Genital/Anal swab)    Collection Time: 06/18/24  3:04 PM    Specimen: Genital   Result Value Ref Range    C. trachomatis by PCR Negative Negative    N. gonorrhoeae by PCR Negative Negative    Source Genital    PANORAMA PRENATAL TEST    Collection Time: 06/28/24 12:21 PM   Result Value Ref Range    REPORT SUMMARY LOW RISK     REPORT NOTE See Notes     GENDER OF FETUS Female     FETAL FRACTION 15.2%     TRISOMY 21 RESULT TEXT Low Risk     TRISOMY 21 AGE-BASED RISK TEXT 1/1,068 (0.09%)     TRISOMY 21 RISK SCORE TEXT <1/10,000 (<0.01%)     TRISOMY 18 RESULT TEXT Low Risk     TRISOMY 18 AGE-BASED RISK TEXT 1/2,484 (0.04%)     TRISOMY 18 RISK SCORE TEXT <1/10,000 (<0.01%)     TRISOMY 13 RESULT TEXT Low Risk      TRISOMY 13 AGE-BASED RISK TEXT 1/7,826 (0.01%)     TRISOMY 13 RISK SCORE TEXT <1/10,000 (<0.01%)     MONOSOMY X RESULT TEXT Low Risk     MONOSOMY X AGE-BASED RISK TEXT 1/255 (0.39%)     MONOSOMY X RISK SCORE TEXT <1/10,000 (<0.01%)     TRIPLOIDY RESULT TEXT Low Risk     22Q11.2 DELETION SYNDROME RESULT TEXT Low Risk     22Q11.2 DELETION SYNDROME POPULATION-BASED RISK TEXT 1/2,000     22Q11.2 DELETION SYNDROME RISK SCORE TEXT 1/12,000     FOOTNOTES See Notes    VAGINAL PATHOGENS DNA PANEL    Collection Time: 07/17/24  2:02 PM   Result Value Ref Range    Candida species DNA Probe Negative Negative    Trichamonas vaginalis DNA Probe Negative Negative    Gardnerella vaginalis DNA Probe Negative Negative   GLUCOSE 1HR GESTATIONAL    Collection Time: 09/23/24  9:23 AM   Result Value Ref Range    Glucose, Post Dose 101 70 - 139 mg/dL   T.PALLIDUM AB DONALD (SCREENING)    Collection Time: 09/23/24  9:23 AM   Result Value Ref Range    Syphilis, Treponemal Qual Non-Reactive Non-Reactive   CBC WITH DIFFERENTIAL    Collection Time: 09/23/24  9:23 AM   Result Value Ref Range    WBC 11.0 (H) 4.8 - 10.8 K/uL    RBC 3.88 (L) 4.20 - 5.40 M/uL    Hemoglobin 11.9 (L) 12.0 - 16.0 g/dL    Hematocrit 37.4 37.0 - 47.0 %    MCV 96.4 81.4 - 97.8 fL    MCH 30.7 27.0 - 33.0 pg    MCHC 31.8 (L) 32.2 - 35.5 g/dL    RDW 47.8 35.9 - 50.0 fL    Platelet Count 256 164 - 446 K/uL    MPV 10.2 9.0 - 12.9 fL    Neutrophils-Polys 77.10 (H) 44.00 - 72.00 %    Lymphocytes 15.10 (L) 22.00 - 41.00 %    Monocytes 4.30 0.00 - 13.40 %    Eosinophils 2.40 0.00 - 6.90 %    Basophils 0.30 0.00 - 1.80 %    Immature Granulocytes 0.80 0.00 - 0.90 %    Nucleated RBC 0.00 0.00 - 0.20 /100 WBC    Neutrophils (Absolute) 8.52 (H) 1.82 - 7.42 K/uL    Lymphs (Absolute) 1.67 1.00 - 4.80 K/uL    Monos (Absolute) 0.47 0.00 - 0.85 K/uL    Eos (Absolute) 0.26 0.00 - 0.51 K/uL    Baso (Absolute) 0.03 0.00 - 0.12 K/uL    Immature Granulocytes (abs) 0.09 0.00 - 0.11 K/uL    NRBC  (Absolute) 0.00 K/uL   URINE CULTURE(NEW)    Collection Time: 10/04/24 11:00 AM    Specimen: Urine   Result Value Ref Range    Significant Indicator NEG     Source UR     Site -     Culture Result Usual urogenital mery 10-50,000 cfu/mL    POCT Urinalysis    Collection Time: 10/04/24 11:50 AM   Result Value Ref Range    POC Color yellow Negative    POC Appearance clear Negative    POC Glucose negative Negative mg/dL    POC Bilirubin negative Negative mg/dL    POC Ketones trace Negative mg/dL    POC Specific Gravity 1.015 <1.005 - >1.030    POC Blood negative Negative    POC Urine PH 7.0 5.0 - 8.0    POC Protein negative Negative mg/dL    POC Urobiligen 0.2 E.U./dl Negative (0.2) mg/dL    POC Nitrites negative Negative    POC Leukocyte Esterase negative Negative   GRP B STREP, BY PCR (YORK BROTH)    Collection Time: 12/06/24  3:48 PM    Specimen: Genital   Result Value Ref Range    Strep Gp B DNA PCR Negative Negative   POCT urinalysis device results    Collection Time: 12/22/24 12:09 AM   Result Value Ref Range    POC Color Yellow     POC Appearance Clear     POC Glucose Negative Negative mg/dL    POC Ketones Negative Negative mg/dL    POC Specific Gravity 1.015 1.005 - 1.030    POC Blood Small (A) Negative    POC Urine PH 6.0 5.0 - 8.0    POC Protein Negative Negative mg/dL    Bilirubin Negative Negative    Urobilinogen, Urine 0.2 Negative    POC Nitrites Negative Negative    POC Leukocyte Esterase Negative Negative          Assessment:   Juju Spencer at 38w6d  Labor status: Early latent labor.  Obstetrical history significant for   Patient Active Problem List    Diagnosis Date Noted    Labor and delivery indication for care or intervention 12/22/2024    Prenatal care, third trimester 10/22/2024    History of depression and anxiety 06/18/2024    Encounter for prenatal care in second trimester of first pregnancy 06/18/2024    Bipolar 06/18/2024    Marijuana use 06/18/2024   .      Plan:     1. Admit to L&D  2. GBS  negative   3. Desires epidural for pain relief. May have when desired. Educated patient on labor progress   4. Plan at this time is expectant management . Consider AROM for augmentation if appropriate.       CFdory TUCKER/ Dr. Sargent Attending

## 2024-12-22 NOTE — PROGRESS NOTES
0205-Report received from Aura ROSENBAUM. POC discussed, all questions have been answered at this time, care assumed. This RN to ambulate pt to S216.    0623-This RN and William TUCKER at bedside, pt reports LOF, updated on pt status/FHT/ctx, SVE as charted, POC discussed with pt.     0700-Bedside report given to Daysi ROSENBAUM. POC discussed, all questions have been answered at this time, care relinquished.

## 2024-12-22 NOTE — PROGRESS NOTES
S: Pt is laying in bed upon entry to room. Reports increasing pain with her contractions. She also believes that approximately 5 minutes ago, she began to leak fluid. Partner is at bedside.      O:    Vitals:    24 0805 24 0810 24 0820 24 0840   BP: 113/62 111/56 100/54 102/60   Pulse: 92 (!) 104 (!) 110 88   Resp:       Temp:       TempSrc:       SpO2:       Weight:       Height:               FHTs:  Baseline 140, pos accels, no decels, moderate variability        [place]: Contractions q 3-6 minutes, moderate to palpation        SVE: 4/70/-2 forebag is noted with some fluid return. AROM of forebag completed     A/P:    1.  IUP @ 38w6d   2.  Cat I FHTs    3.  Early Labor- discussed labor progression and possiblity of pitocin use for augmentation in her course. She desires epidural for pain management and may have when appropriate.     4.  Anticipate .     SAMARA Donnelly, GARRETT

## 2024-12-22 NOTE — ED PROVIDER NOTES
"Emergency Obstetric Consultation     Date of Service  2024    Reason for Consultation  Chief Complaint   Patient presents with    Vaginal Bleeding       History of Presenting Illness  19 y.o. female who presented 2024 with Reason for consult: vaginal bleedingPatient presents to OB ED for concerns of vaginal bleeding. She reports that this started 1 hour prior to presentation when she was in the bathroom urinating. She does note she had some spotting one day ago. She admits that her partner recently returned to town and they have been sexually active multiple times in the past few days.     She admits to some low back pain that \"comes in waves.\" This has been occurring over the past few days as well. Denies burning with urination.   Fetal activity: Perceived fetal activity is normal.    Last perceived fetal movement was within the past hour.    Prenatal complications: Hx of depression, anxiety    .    Review of Systems  Review of Systems   All other systems reviewed and are negative.      Obstetric History    OB History    Para Term  AB Living   1             SAB IAB Ectopic Molar Multiple Live Births                    # Outcome Date GA Lbr Albino/2nd Weight Sex Type Anes PTL Lv   1 Current                Gynecologic History  Patient's last menstrual period was 2024 (approximate).    Medical History  No past medical history on file.    Surgical History   has no past surgical history on file.    Family History  family history includes Cancer in her maternal grandmother; Lung Disease in her maternal grandfather; No Known Problems in her brother, father, mother, paternal grandfather, paternal grandmother, and sister.    Social History   reports that she has never smoked. She has been exposed to tobacco smoke. She has never used smokeless tobacco. She reports that she does not currently use alcohol. She reports that she does not currently use drugs.    Medications  Medications Prior to " "Admission   Medication Sig Dispense Refill Last Dose/Taking    aspirin (ASA) 81 MG Chew Tab chewable tablet Chew 81 mg every day. (Patient not taking: Reported on 2024)       Prenatal MV-Min-Fe Fum-FA-DHA (PRENATAL 1 PO) Take  by mouth.       ondansetron (ZOFRAN ODT) 4 MG TABLET DISPERSIBLE Take 1 Tablet by mouth every 6 hours as needed for Nausea/Vomiting. (Patient not taking: Reported on 2024) 30 Tablet 3        Allergies  No Known Allergies    Physical Exam  Maternal Exam:  Uterine Assessment: Contraction strength is moderate.  Contraction duration is 45 seconds. Contraction frequency is irregular.   Abdomen: Patient reports no abdominal tenderness. Estimated fetal weight is 3200g.    Fetal presentation: vertex  Introitus: Normal vulva. Normal vagina.  Some dark red blood noted on glove after vaginal exam.  Pelvis: adequate for delivery.    Cervix: 3Baseline rate: 145.   Variability: moderate (6-25 bpm).    Fetal State Assessment: Category I - tracings are normal.  Skin:     General: Skin is warm and dry.   HENT:      Head: Normocephalic and atraumatic.   Cardiovascular:      Rate and Rhythm: Normal rate and regular rhythm.   Musculoskeletal:         General: Normal range of motion.      Cervical back: Normal range of motion.   Abdominal:      Palpations: Abdomen is soft.      Comments: gravid   Constitutional:       Appearance: Normal appearance.   Pulmonary:      Effort: Pulmonary effort is normal.   Psychiatric:         Mood and Affect: Mood normal.         Behavior: Behavior normal.   Neurological:      Mental Status: She is alert.   Genitourinary:     General: Normal vulva.         Laboratory               No results for input(s): \"NTPROBNP\" in the last 72 hours.           Assessment & Plan  Assessment:  Early latent labor.   Membrane status: intact.   Fetal well-being: normal.   1. 20 yo  with IUP at 38.5 weeks  2. Cat I FHR tracing  3. Vaginal bleeding    Plan:  1. Discussed that findings " might be indicative of early labor. At this time, no additional active bleeding with reassuring fetal status. Will monitor for labor          IRON Donnelly

## 2024-12-22 NOTE — CARE PLAN
The patient is Stable - Low risk of patient condition declining or worsening    Shift Goals  Clinical Goals: cervical dilation and effacement  Patient Goals: helathy mom healthy baby  Family Goals: adequate support of pt and baby    Progress made toward(s) clinical / shift goals:      Problem: Risk for Infection and Impaired Wound Healing  Goal: Patient will remain free from infection  Outcome: Progressing  Note: Pt will remain afebrile, and will show no s/s of infection.     Problem: Pain  Goal: Patient's pain will be alleviated or reduced to the patient’s comfort goal  Outcome: Progressing  Note: Pt continuously monitored for pain, educated on pain management options. Call light within reach, pt understands to call for RN regarding any needs or concerns.

## 2024-12-22 NOTE — ANESTHESIA PROCEDURE NOTES
Epidural Block    Date/Time: 12/22/2024 7:43 AM    Performed by: Chi Lopez M.D.  Authorized by: Chi Lopez M.D.    Patient Location:  OB  Start Time:  12/22/2024 7:43 AM  Reason for Block: labor analgesia    patient identified, IV checked, site marked, risks and benefits discussed, surgical consent, monitors and equipment checked and pre-op evaluation    Patient Position:  Sitting  Prep: ChloraPrep, patient draped and sterile technique    Monitoring:  Blood pressure, continuous pulse oximetry and heart rate  Approach:  Midline  Location:  L3-L4  Injection Technique:  JAVI air  Skin infiltration:  Lidocaine  Strength:  1%  Dose:  3ml  Needle Type:  Tuohy  Needle Gauge:  17 G  Needle Length:  3.5 in  Loss of resistance::  5  Catheter Size:  19 G  Catheter at Skin Depth:  10  Test Dose Result:  Negative   1 attempt, - heme, - csf, DPE 25g Julia, + csf, - test dose

## 2024-12-22 NOTE — PROGRESS NOTES
230- Patient arrived to triage for vaginal bleeding and back pain. Patient is a  with an TAZ of 24 making the patient 38.5 weeks pregnant. Patient denies LOF and confirms good FM. External toco and FHM applied to patient. Vitals obtained. MFTI filed.     2345- Report given to GARRETT Thomas. Received orders to re-check patient's cervix in an hour.     0020- Updated provider with SVE findings. Received orders to re-check patient's cervix in an hour.     0135- SVE performed, refer to flowsheet. GARRETT Thomas aware.     0200- GARRETT Thomas at bedside. Patient to be admitted to labor.     0205- Report given to ILSA Serrano.

## 2024-12-22 NOTE — PROGRESS NOTES
0700: Report received from ILSA Serrano.   0733: Dr. Lopez at bedside for epidural placement.  0736: Time out performed prior to epidural, all agree to continue.   0743: Test dose given by MD, no reaction noted.  0928: Dr. Rosado at bedside, SVE performed. Unchanged from prior exam, 4/70/-2.   1235: Pt reporting increased rectal pressure, SVE performed. 6/80/-1.  1528: Dr. Rosado at bedside, SVE performed. 8/90/-1.   1537: Dr. Lopez at bedside for epidural bolus.   1850: Report given to ILSA West.

## 2024-12-23 LAB
ERYTHROCYTE [DISTWIDTH] IN BLOOD BY AUTOMATED COUNT: 43.9 FL (ref 35.9–50)
HCT VFR BLD AUTO: 30.1 % (ref 37–47)
HGB BLD-MCNC: 10.3 G/DL (ref 12–16)
MCH RBC QN AUTO: 31 PG (ref 27–33)
MCHC RBC AUTO-ENTMCNC: 34.2 G/DL (ref 32.2–35.5)
MCV RBC AUTO: 90.7 FL (ref 81.4–97.8)
PLATELET # BLD AUTO: 172 K/UL (ref 164–446)
PMV BLD AUTO: 10.7 FL (ref 9–12.9)
RBC # BLD AUTO: 3.32 M/UL (ref 4.2–5.4)
WBC # BLD AUTO: 16 K/UL (ref 4.8–10.8)

## 2024-12-23 PROCEDURE — 700102 HCHG RX REV CODE 250 W/ 637 OVERRIDE(OP)

## 2024-12-23 PROCEDURE — 36415 COLL VENOUS BLD VENIPUNCTURE: CPT

## 2024-12-23 PROCEDURE — A9270 NON-COVERED ITEM OR SERVICE: HCPCS

## 2024-12-23 PROCEDURE — 770002 HCHG ROOM/CARE - OB PRIVATE (112)

## 2024-12-23 PROCEDURE — 85027 COMPLETE CBC AUTOMATED: CPT

## 2024-12-23 RX ADMIN — IBUPROFEN 800 MG: 800 TABLET, FILM COATED ORAL at 17:23

## 2024-12-23 RX ADMIN — ACETAMINOPHEN 1000 MG: 500 TABLET ORAL at 05:10

## 2024-12-23 RX ADMIN — IBUPROFEN 800 MG: 800 TABLET, FILM COATED ORAL at 08:54

## 2024-12-23 ASSESSMENT — PAIN DESCRIPTION - PAIN TYPE
TYPE: ACUTE PAIN

## 2024-12-23 ASSESSMENT — EDINBURGH POSTNATAL DEPRESSION SCALE (EPDS)
I HAVE BEEN ABLE TO LAUGH AND SEE THE FUNNY SIDE OF THINGS: AS MUCH AS I ALWAYS COULD
I HAVE FELT SAD OR MISERABLE: NO, NOT AT ALL
I HAVE BEEN SO UNHAPPY THAT I HAVE HAD DIFFICULTY SLEEPING: NOT AT ALL
THE THOUGHT OF HARMING MYSELF HAS OCCURRED TO ME: NEVER
I HAVE BEEN SO UNHAPPY THAT I HAVE BEEN CRYING: ONLY OCCASIONALLY
I HAVE BEEN ANXIOUS OR WORRIED FOR NO GOOD REASON: HARDLY EVER
THINGS HAVE BEEN GETTING ON TOP OF ME: NO, MOST OF THE TIME I HAVE COPED QUITE WELL
I HAVE FELT SCARED OR PANICKY FOR NO GOOD REASON: NO, NOT MUCH
I HAVE LOOKED FORWARD WITH ENJOYMENT TO THINGS: AS MUCH AS I EVER DID
I HAVE BLAMED MYSELF UNNECESSARILY WHEN THINGS WENT WRONG: NOT VERY OFTEN

## 2024-12-23 ASSESSMENT — PATIENT HEALTH QUESTIONNAIRE - PHQ9
SUM OF ALL RESPONSES TO PHQ9 QUESTIONS 1 AND 2: 0
2. FEELING DOWN, DEPRESSED, IRRITABLE, OR HOPELESS: NOT AT ALL
1. LITTLE INTEREST OR PLEASURE IN DOING THINGS: NOT AT ALL

## 2024-12-23 NOTE — CARE PLAN
The patient is Stable - Low risk of patient condition declining or worsening    Shift Goals  Clinical Goals: Cervical change, healthy mom and baby  Patient Goals: Healthy mom and baby, pain control  Family Goals: Support, education    Progress made toward(s) clinical / shift goals:    Problem: Knowledge Deficit - L&D  Goal: Patient and family/caregivers will demonstrate understanding of plan of care, disease process/condition, diagnostic tests and medications  Outcome: Progressing  Pt continuously updated on plan of care and educated regarding interventions, medications, etc. Pt verbalizes good understanding, appropriately involved in plan of care.     Problem: Psychosocial - L&D  Goal: Patient's level of anxiety will decrease  Outcome: Progressing  Family and friends at bedside throughout day to provide emotional support to pt. Pt verbalizes needs adequately, free from anxiety at this time.     Problem: Risk for Infection and Impaired Wound Healing  Goal: Patient will remain free from infection  Outcome: Progressing  Pt afebrile and free from s/s of infection this shift.     Problem: Pain  Goal: Patient's pain will be alleviated or reduced to the patient’s comfort goal  Outcome: Progressing  Pt reports appropriate pain control at this time. Pt educated regarding available interventions. Epidural bolus dose by Dr. Lopez this afternoon improved pt's pain greatly, now feeling very comfortable.     Patient is not progressing towards the following goals: N/A

## 2024-12-23 NOTE — PROGRESS NOTES
1900- Report received from Daysi ROSENBAUM. POC discussed all questions answered. Care assumed.    2315- Pt transferred to postpartum unit with FOB and infant in arms. Bands verified x2 RNs. Fundus firm, bleeding light. Report given to Nano ROSENBUAM. Care relinquished.

## 2024-12-23 NOTE — CARE PLAN
Problem: Knowledge Deficit - Postpartum  Goal: Patient will verbalize and demonstrate understanding of self and infant care  Outcome: Progressing     Problem: Altered Physiologic Condition  Goal: Patient physiologically stable as evidenced by normal lochia, palpable uterine involution and vitals within normal limits  Outcome: Progressing   The patient is Stable - Low risk of patient condition declining or worsening    Shift Goals  Clinical Goals: stable VS and lochia WNL  Patient Goals: rest and feed baby  Family Goals: support    Progress made toward(s) clinical / shift goals:    Pt reports comfort after pain interventions, Fundus firm and lochia light, VSS, educated on POC, needs met at this time. Questions answered. Will continue to educate.

## 2024-12-23 NOTE — CARE PLAN
The patient is Watcher - Medium risk of patient condition declining or worsening    Shift Goals  Clinical Goals: cervical dilation  Patient Goals: healthy vaginal delivery  Family Goals: provide support    Progress made toward(s) clinical / shift goals:    Problem: Knowledge Deficit - L&D  Goal: Patient and family/caregivers will demonstrate understanding of plan of care, disease process/condition, diagnostic tests and medications  Outcome: Progressing  Note: Patient educated and involved in plan of care, no outstanding questions or concerns at this time.     Problem: Pain  Goal: Patient's pain will be alleviated or reduced to the patient’s comfort goal  Outcome: Progressing  Note: Patient coping well with epidural at this time, verbalizes needs as they arise.        Patient is not progressing towards the following goals:

## 2024-12-23 NOTE — L&D DELIVERY NOTE
Admit Date:   2024    Pregnancy Complications: none  Medical Induction of Labor: no  GBS: negative  Anesthesia: epidural  Gestational Age at delivery: 38w6d    Juju Spencer is a 19 y.o.  at 38w6d gestation who presented with contractions and was admitted in active labor. Her labor was augmented with pitocin. GBS was negative. Their pregnancy course was uncomplicated. Juju Spencer utilized epidural for pain management. During pushing with maternal efforts, fetal decent was slow once at +2 station to , so Charge RN and respiratory therapy was notified to be in the room at time of delivery in the case of a shoulder dystocia. Head delivered at 20:42 with maternal pushing efforts, though anterior should did not follow with gentle downward traction. A shoulder dystocia was promptly called patient layed flat and placed in Sandi position by Charge RN and L&D RN. Suprapubic pressure was applied from maternal right and anterior shoulder released. Body was born approximately 30 seconds after delivery of fetal head with maternal pushing efforts. No nuchal was noted. Infant placed on maternal abdomen, dried and stimulated and spontaneous cry was noted. Apgars 8,9.     After delayed cord clamping, cord was doubly clamped and cut by FOB. Signs of placenta separation noted and placenta delivered spontaneously at 2048 where 3VC was noted. Pitocin infused via IV bolus. . Fundus firm with minimal massage. Routine postpartum care was initiated as mother and infant stable. Infant weight is pending    Vulva and vagina were inspected and noted to have a first degree perineal tear and an extensive right labial tear that extended vertically. Rectovaginal septum and anal sphincter noted to be intact. Epidural was utilized for anesthetic and repair was completed with 3-0 Vicryl suture.     The shoulder dystocia and maneuvers used were discussed with the patient and her family. All questions were answered to their  satisfaction.        GARRETT Pulido MD Attending

## 2024-12-23 NOTE — DISCHARGE PLANNING
Discharge Planning Assessment Post Partum    Reason for Referral: hx of depression, anxiety, bipolar disorder, and THC use  Address: [address] [place] [address]  Type of Living Situation: with FOB/parents  Mom Diagnosis: labor and delivery  Baby Diagnosis:   Primary Language: English    Name of Baby: Cordelia Santizo  Father of the Baby: Herson Santizo  Involved in baby’s care? yes  Contact Information: [phone]    Prenatal Care: yes - [facility] (Jeni Siddiqui)  Mom's PCP: none  PCP for new baby: none yet - list of pediatricians given    Support System: FOB and parents  Coping/Bonding between mother & baby: yes  Source of Feeding: breastfeeding  Supplies for Infant: has all supplies    Mom's Insurance: [employer] Medicaid  Baby Covered on Insurance: Medicaid  Mother Employed/School: previously working full time  Other children in the home/names & ages: none - first child    Financial Hardship/Income: denies - FOB working full time  Mom's Mental status: alert and oriented  Services used prior to admit: SNAP    CPS History: none  Psychiatric History: depression, anxiety, bipolar disorder - denies any concerns presently. Is aware of signs/when to seek help and has supports in place already. Additional resources given as well.  Domestic Violence History: denies  Drug/ETOH History: THC prior to pregnancy    Resources Provided: WIC, post partum resources, post partum blues/depression, pediatrician list  Referrals Made: diaper bank     Clearance for Discharge: baby is cleared to PA home with parents once ready

## 2024-12-23 NOTE — PROGRESS NOTES
Obstetrics & Gynecology Post-Delivery Progress Note    Date of Service  2024    19 y.o.  s/p vaginal, spontaneous  Delivery date: 2024    Hospital Course  Juju was admitted in active labor. Her labor was augmented with pitocin and she progressed well to the second stage. Her delivery was complicated by a shoulder dystocia that lasted 60 seconds. Apgars 8/9. Postpartum course has been unremarkable thus far.     Subjective  Pain: Yes,  controlled  Bleeding: lochia moderate  PO's: taking regular diet  Voiding: without difficulty  Ambulating: yes  Passing flatus: Yes  Feeding:  planning to breastfeed    Objective  Temp:  [36.3 °C (97.3 °F)-37.2 °C (98.9 °F)] 36.3 °C (97.4 °F)  Pulse:  [] 95  Resp:  [14-19] 16  BP: ()/(49-79) 105/58  SpO2:  [93 %-98 %] 96 %    Physical Exam  General: well  Chest/Breasts: nipples intact   Abdomen: non-distended  Fundus: below umbilicus  Incision: not applicable, (vaginal delivery)  Perineum: deferred  Extremities: symmetric, calves nontender    Recent Labs     24  0240 24  0504   WBC 11.8* 16.0*   RBC 3.82* 3.32*   HEMOGLOBIN 11.7* 10.3*   HEMATOCRIT 35.2* 30.1*   MCV 92.1 90.7   MCH 30.6 31.0   RDW 43.8 43.9   PLATELETCT 225 172   MPV 10.8 10.7   NEUTSPOLYS 73.90*  --    LYMPHOCYTES 18.70*  --    MONOCYTES 5.20  --    EOSINOPHILS 1.20  --    BASOPHILS 0.30  --        Assessment/Plan  Juju Spencer is a 19 y.o.yo  s/p postpartum day #1  s/p vaginal, spontaneous    1. Post care: meeting all goals  2. Hemodynamics: hemodynamically stable (11.7/35.2 -> 10.3/30.1)  3. Pain: controlled  4. Rh+, Rubella Immune  5. Method of Feeding: plans to breastfeed  6. Method of Contraception:  not addressed  7. Disposition: likely home postpartum day 2    VTE prophylaxis: none indicated

## 2024-12-23 NOTE — LACTATION NOTE
This note was copied from a baby's chart.  Initial LC visit, mother working on breastfeeding with RN when LC arrived, infant fed at breast in nutritive pattern with multiple audible swallows, taught identification of swallows at breast. Infant self detached content at end of feeding and nipple observed round and intact. Reviewed hunger cues, cluster feeding, cue based feedings, milk onset. Plan for cue based breastfeeding at least 8 or more times each 24 hours. Referral sent for [facility] enrollment. Mother denies questions/concerns.

## 2024-12-23 NOTE — ANESTHESIA POSTPROCEDURE EVALUATION
Patient: Juju Spencer    Procedure Summary       Date: 12/22/24 Room / Location:     Anesthesia Start: 0733 Anesthesia Stop: 2042    Procedure: Labor Epidural Diagnosis:     Scheduled Providers:  Responsible Provider: Chi Lopez M.D.    Anesthesia Type: epidural ASA Status: 2            Final Anesthesia Type: epidural  Last vitals  BP   Blood Pressure: 102/51    Temp   36.8 °C (98.2 °F)    Pulse   88   Resp   14    SpO2   97 %      Anesthesia Post Evaluation    Patient location during evaluation: PACU  Patient participation: complete - patient participated  Level of consciousness: awake and alert  Pain score: 6    Airway patency: patent  Anesthetic complications: no  Cardiovascular status: hemodynamically stable  Respiratory status: acceptable  Hydration status: euvolemic    PONV: none          No notable events documented.     Nurse Pain Score: 10 (NPRS)

## 2024-12-23 NOTE — ANESTHESIA TIME REPORT
Anesthesia Start and Stop Event Times       Date Time Event    12/22/2024 0733 Ready for Procedure     0733 Anesthesia Start     2042 Anesthesia Stop          Responsible Staff  12/22/24      Name Role Begin End    Chi Lopez M.D. Anesth 0733 2042          Overtime Reason:  no overtime (within assigned shift)    Comments:

## 2024-12-23 NOTE — CARE PLAN
The patient is Stable - Low risk of patient condition declining or worsening    Shift Goals  Clinical Goals: VSS, lochia light, fundus firm  Patient Goals: bond with infant  Family Goals: support    Progress made toward(s) clinical / shift goals:  Pt is able to care for self and infant. Pt is bonding with infant. Fundus is firm and lochia is scant.       Problem: Knowledge Deficit - Postpartum  Goal: Patient will verbalize and demonstrate understanding of self and infant care  Outcome: Progressing     Problem: Psychosocial - Postpartum  Goal: Patient will verbalize and demonstrate effective bonding and parenting behavior  Outcome: Progressing     Problem: Altered Physiologic Condition  Goal: Patient physiologically stable as evidenced by normal lochia, palpable uterine involution and vitals within normal limits  Outcome: Progressing     Problem: Infection - Postpartum  Goal: Postpartum patient will be free of signs and symptoms of infection  Outcome: Progressing     Problem: Early Mobilization - Post Surgery  Goal: Early mobilization post surgery  Outcome: Progressing       Patient is not progressing towards the following goals:

## 2024-12-24 ENCOUNTER — PHARMACY VISIT (OUTPATIENT)
Dept: PHARMACY | Facility: MEDICAL CENTER | Age: 19
End: 2024-12-24
Payer: MEDICARE

## 2024-12-24 ENCOUNTER — LACTATION ENCOUNTER (OUTPATIENT)
Dept: NURSERY | Facility: MEDICAL CENTER | Age: 19
End: 2024-12-24

## 2024-12-24 VITALS
HEART RATE: 68 BPM | DIASTOLIC BLOOD PRESSURE: 72 MMHG | WEIGHT: 151 LBS | OXYGEN SATURATION: 95 % | TEMPERATURE: 98.2 F | SYSTOLIC BLOOD PRESSURE: 99 MMHG | BODY MASS INDEX: 26.75 KG/M2 | RESPIRATION RATE: 18 BRPM | HEIGHT: 63 IN

## 2024-12-24 PROCEDURE — RXMED WILLOW AMBULATORY MEDICATION CHARGE: Performed by: PHYSICIAN ASSISTANT

## 2024-12-24 PROCEDURE — 700102 HCHG RX REV CODE 250 W/ 637 OVERRIDE(OP)

## 2024-12-24 PROCEDURE — A9270 NON-COVERED ITEM OR SERVICE: HCPCS

## 2024-12-24 RX ORDER — FERROUS SULFATE 325(65) MG
325 TABLET ORAL DAILY
Qty: 30 TABLET | Refills: 0 | Status: SHIPPED | OUTPATIENT
Start: 2024-12-24

## 2024-12-24 RX ORDER — IBUPROFEN 800 MG/1
800 TABLET, FILM COATED ORAL EVERY 8 HOURS PRN
Qty: 30 TABLET | Refills: 0 | Status: SHIPPED | OUTPATIENT
Start: 2024-12-24

## 2024-12-24 RX ORDER — PSEUDOEPHEDRINE HCL 30 MG
100 TABLET ORAL 2 TIMES DAILY PRN
Qty: 60 CAPSULE | Refills: 0 | Status: SHIPPED | OUTPATIENT
Start: 2024-12-24

## 2024-12-24 RX ADMIN — IBUPROFEN 800 MG: 800 TABLET, FILM COATED ORAL at 02:08

## 2024-12-24 ASSESSMENT — PAIN DESCRIPTION - PAIN TYPE
TYPE: ACUTE PAIN
TYPE: ACUTE PAIN

## 2024-12-24 NOTE — DISCHARGE INSTRUCTIONS

## 2024-12-24 NOTE — DISCHARGE SUMMARY
Discharge Summary:      Juju Spencer    Admit Date:   2024  Discharge Date:  2024     Admitting diagnosis:  Labor and delivery indication for care or intervention [O75.9]  Discharge Diagnosis: Status post vaginal, spontaneous.  Pregnancy Complications: marijuana use  Tubal Ligation:  no        History:  History reviewed. No pertinent past medical history.  OB History    Para Term  AB Living   1 1 1     1   SAB IAB Ectopic Molar Multiple Live Births           0 1      # Outcome Date GA Lbr Albino/2nd Weight Sex Type Anes PTL Lv   1 Term 24 38w6d / 01:28 3.525 kg (7 lb 12.3 oz) F Vag-Spont EPI N TOMAS      Complications: Shoulder Dystocia        Patient has no known allergies.  Patient Active Problem List    Diagnosis Date Noted    Postpartum care following vaginal delivery 2024    Labor and delivery indication for care or intervention 2024    Prenatal care, third trimester 10/22/2024    History of depression and anxiety 2024    Encounter for prenatal care in second trimester of first pregnancy 2024    Bipolar 2024    Marijuana use 2024        Hospital Course:   19 y.o. , now para 1, was admitted with the above mentioned diagnosis, underwent Active Labor, vaginal, spontaneous. Patient postpartum course was unremarkable, with progressive advancement in diet , ambulation and toleration of oral analgesia. Patient without complaints today and desires discharge.      Vitals:    24 0200 24 0608 24 1834 24 0543   BP: 98/63 105/58 103/65 99/72   Pulse: 95 96 70 68   Resp: 17 16 16 18   Temp: 36.7 °C (98.1 °F) 36.3 °C (97.4 °F) 37.2 °C (99 °F) 36.8 °C (98.2 °F)   TempSrc: Temporal Temporal Temporal Temporal   SpO2: 94% 96% 98% 95%   Weight:       Height:           Current Facility-Administered Medications   Medication Dose    LR infusion      oxytocin (Pitocin) infusion (for post delivery)  125 mL/hr    ropivacaine 0.2 % (Naropin)  injection      oxytocin (Pitocin) 0.02 Units/mL  0-20 ben-units/min    lactated ringers infusion  2,000 mL    docusate sodium (Colace) capsule 100 mg  100 mg    ibuprofen (Motrin) tablet 800 mg  800 mg    acetaminophen (Tylenol) tablet 1,000 mg  1,000 mg       Exam:  Breast Exam: negative  Abdomen: Abdomen soft, non-tender. BS normal. No masses,  No organomegaly  Fundus Non Tender: yes  Incision: none  Perineum: perineum intact  Extremity: extremities, peripheral pulses and reflexes normal     Labs:  Recent Labs     12/22/24  0240 12/23/24  0504   WBC 11.8* 16.0*   RBC 3.82* 3.32*   HEMOGLOBIN 11.7* 10.3*   HEMATOCRIT 35.2* 30.1*   MCV 92.1 90.7   MCH 30.6 31.0   MCHC 33.2 34.2   RDW 43.8 43.9   PLATELETCT 225 172   MPV 10.8 10.7        Activity:   Discharge to home  Pelvic Rest x 6 weeks    Assessment:  normal postpartum course  Discharge Assessment: No heavy bleeding or foul vaginal discharge   Work note given per pt request     Follow up: [facility] in 5 weeks for vaginal ; 1 week for incision check.      Discharge Meds:   Current Outpatient Medications   Medication Sig Dispense Refill    docusate sodium 100 MG Cap Take 100 mg by mouth 2 times a day as needed for Constipation. 60 Capsule 0    ibuprofen (MOTRIN) 800 MG Tab Take 1 Tablet by mouth every 8 hours as needed for Mild Pain. 30 Tablet 0       Tamsen HEMANT Brady, P.A.

## 2024-12-24 NOTE — CARE PLAN
The patient is Stable - Low risk of patient condition declining or worsening    Shift Goals  Clinical Goals: VSS, lochia light, fundus firm  Patient Goals: bond with infant  Family Goals: support    Progress made toward(s) clinical / shift goals:    Problem: Psychosocial - Postpartum  Goal: Patient will verbalize and demonstrate effective bonding and parenting behavior  Outcome: Progressing  Note: MOB bonding well with baby, providing all cares at this time, calls for assistance as needed.      Problem: Altered Physiologic Condition  Goal: Patient physiologically stable as evidenced by normal lochia, palpable uterine involution and vitals within normal limits  Outcome: Progressing  Note: Fundus firm with light lochia, patient voiding well.        Patient is not progressing towards the following goals:

## 2024-12-24 NOTE — LACTATION NOTE
This note was copied from a baby's chart.  I met with parents of baby this morning to offer breast feeding and lactation support. We covered topics of feeding cues, optimal latch angle and infant and maternal body positioning.     I advised them to view some videos today on the [employer] alfonso which will offer more in-depth information.They have already downloaded this alfonso and plan to view videos.    Discharge resource list provided and support circles encouraged.

## 2025-02-03 ENCOUNTER — APPOINTMENT (OUTPATIENT)
Dept: OBGYN | Facility: CLINIC | Age: 20
End: 2025-02-03
Payer: COMMERCIAL

## 2025-02-03 VITALS — SYSTOLIC BLOOD PRESSURE: 80 MMHG | WEIGHT: 125.8 LBS | DIASTOLIC BLOOD PRESSURE: 56 MMHG | BODY MASS INDEX: 22.28 KG/M2

## 2025-02-03 DIAGNOSIS — Z87.59 HISTORY OF SHOULDER DYSTOCIA IN PRIOR PREGNANCY: ICD-10-CM

## 2025-02-03 PROCEDURE — 3078F DIAST BP <80 MM HG: CPT | Performed by: STUDENT IN AN ORGANIZED HEALTH CARE EDUCATION/TRAINING PROGRAM

## 2025-02-03 PROCEDURE — 3074F SYST BP LT 130 MM HG: CPT | Performed by: STUDENT IN AN ORGANIZED HEALTH CARE EDUCATION/TRAINING PROGRAM

## 2025-02-03 PROCEDURE — 0503F POSTPARTUM CARE VISIT: CPT | Performed by: STUDENT IN AN ORGANIZED HEALTH CARE EDUCATION/TRAINING PROGRAM

## 2025-02-03 ASSESSMENT — EDINBURGH POSTNATAL DEPRESSION SCALE (EPDS)
I HAVE BLAMED MYSELF UNNECESSARILY WHEN THINGS WENT WRONG: NO, NEVER
I HAVE BEEN ANXIOUS OR WORRIED FOR NO GOOD REASON: NO, NOT AT ALL
I HAVE BEEN ABLE TO LAUGH AND SEE THE FUNNY SIDE OF THINGS: AS MUCH AS I ALWAYS COULD
I HAVE FELT SAD OR MISERABLE: NOT VERY OFTEN
I HAVE FELT SCARED OR PANICKY FOR NO GOOD REASON: NO, NOT AT ALL
THE THOUGHT OF HARMING MYSELF HAS OCCURRED TO ME: NEVER
TOTAL SCORE: 2
I HAVE BEEN SO UNHAPPY THAT I HAVE BEEN CRYING: NO, NEVER
THINGS HAVE BEEN GETTING ON TOP OF ME: NO, MOST OF THE TIME I HAVE COPED QUITE WELL
I HAVE LOOKED FORWARD WITH ENJOYMENT TO THINGS: AS MUCH AS I EVER DID
I HAVE BEEN SO UNHAPPY THAT I HAVE HAD DIFFICULTY SLEEPING: NOT AT ALL

## 2025-02-03 NOTE — PROGRESS NOTES
Pt is here today for postpartum visit.   Delivery type : 24 @38w6d via    Currently breast feeding .   LMP : 25 aprox   BCM : Desires a non-hormonal option that will be safe for BF.   Last pap : N/A   EPDS : 2  Pt states she has no concerns.

## 2025-02-03 NOTE — PROGRESS NOTES
POST PARTUM VISIT NOTE    SUBJECTIVE:  Juju Spencer is a 19 y.o.  who presents for postpartum exam.   I have reviewed the prenatal and intrapartum course. Patient presented in labor and progressed to a vaginal delivery. She has not resumed intercourse. She thinks she had a period on 25. She had stopped bleeding completely for about 3 days and then had what she typically experiences as a period for 4 days and then it stopped. She has not had any bleeding since.      Patient is doing well. Infant is gaining weight on their growth curve and seeing pediatrician as directed.     Date of delivery :2024  Type of delivery:  with 30s shoulder dystocia. No  injury or sequela noted    Last pap date: n/a age    Delivery laceration(s): yes - 1st degree and right labial  Feeding: breast and pumping and bottle feeding. She is not supplementing with formula  Postpartum course: uncomplicated, no urinary leakage  Contraceptive plans: non-hormonal IUD    ROS:  She feels well healed.   Laceration and/or Incision is well healed.   Bleeding lasted about 4-6 weeks.  She is getting along well with her partner.   She reports her mood is good. Denies postpartum blues.   She has not had sex.   Denies urinary or stool incontinence.    I have reviewed the patient's PMH for contraceptive risk factors, and she has no contraindications to contraception as planned.     OBJECTIVE:    BP (!) 80/56   Wt 125 lb 12.8 oz   LMP 2024 (Approximate)   BMI 22.28 kg/m²     Appears well, vital signs normal.  Breast Exam: exam deferred.  Abdomen: soft, nontender  Pelvic Exam: deferred to follow up    EPDS score:  230    ASSESSMENT:  1. History of shoulder dystocia in prior pregnancy        2. Postpartum care and examination        18yo  s/p  c/b 30s shoulder dystocia without any  injury.     PLAN:  RTO for annual exam due in 2025  Contraception planned:  Paragard IUD. Pt desires to make an  appointment for that today  Discussed appropriate pregnancy interval of 12-18m.   Pt cleared for activity and off pelvic rest.     Melinda Saldivar DO, FACOG  [facility]

## 2025-02-18 ENCOUNTER — APPOINTMENT (OUTPATIENT)
Dept: OBGYN | Facility: CLINIC | Age: 20
End: 2025-02-18
Payer: COMMERCIAL

## 2025-03-04 ENCOUNTER — APPOINTMENT (OUTPATIENT)
Dept: OBGYN | Facility: CLINIC | Age: 20
End: 2025-03-04
Payer: COMMERCIAL

## 2025-04-01 ENCOUNTER — APPOINTMENT (OUTPATIENT)
Dept: OBGYN | Facility: CLINIC | Age: 20
End: 2025-04-01
Payer: COMMERCIAL

## 2025-04-01 VITALS — WEIGHT: 124 LBS | BODY MASS INDEX: 21.97 KG/M2 | SYSTOLIC BLOOD PRESSURE: 110 MMHG | DIASTOLIC BLOOD PRESSURE: 78 MMHG

## 2025-04-01 DIAGNOSIS — Z30.430 ENCOUNTER FOR IUD INSERTION: ICD-10-CM

## 2025-04-01 LAB
POCT INT CON NEG: NEGATIVE
POCT INT CON POS: POSITIVE
POCT URINE PREGNANCY TEST: NEGATIVE

## 2025-04-01 PROCEDURE — 81025 URINE PREGNANCY TEST: CPT | Performed by: OBSTETRICS & GYNECOLOGY

## 2025-04-01 PROCEDURE — 58300 INSERT INTRAUTERINE DEVICE: CPT | Mod: GC | Performed by: OBSTETRICS & GYNECOLOGY

## 2025-04-01 NOTE — PROGRESS NOTES
19 y.o.    Female presents here today for her IUD insertion:     No LMP recorded.    Today the patient is counseled on the risks of IUD insertion. I also discussed with the patient the risk of infection on insertion, and had asked the patient to remain on pelvic rest for one week following the insertion. We also discussed the risk of IUD expulsion, the risk of uterine perforation and IUD migration. If the IUD does migrate the patient may require a separate procedure such as a laparoscopy to retrieve the migrated IUD. I also discussed the 1% risk of pregnancy with IUD use. Also discussed with patient today increased risk of ectopic pregnancy with IUD use. Discussed specific side effects of ParaGard IUD which can be increased vaginal bleeding during menses or increased dysmenorrhea. Also discussed today the possibility that IUD may need to be removed secondary to bleeding profile or pain. Also discussed were the possibility that partner can feel the IUD during intercourse. I also discussed the side effects of Mirena which can be amenorrhea or dysfunctional uterine bleeding or spotting.  Patient had the opportunity to ask questions regarding insertion, risks and benefits, all questions are answered in their entirety.  Informed consent is signed.    Procedure note  Urine/Serum pregnancy test is negative, informed consent was previously signed  The bimanual exam is performed the uterus is noted to be mid position  A speculum was inserted into the vagina, the cervix was cleansed with Betadine swabs x3  Tenaculum was placed on the anterior lip of the cervix after injection with local anesthetic  The uterus was sounded to a depth of 7.5 centimeters  The IUD and sheath are inserted through the external and internal cervix os up to the level of the fundus, pulled back slightly, and deployed under sterile conditions   The strings trimmed to approximately 3 cm  Tenaculum was removed from the cervix and hemostasis was  achieved with silver nitrate  The patient tolerated the procedure well    Patient is asked to followup in 4 to 6 weeks for IUD check. The patient is asked to remain on pelvic rest for two weeks. She is instructed to use a second method of contraception until follow up visit and placement confirmed. She is asked to return to office sooner as needed for heavy vaginal bleeding, uncontrolled pain, fever, or any other concerns.

## 2025-04-01 NOTE — PROGRESS NOTES
Patient here for GYN visit.   Last seen on :   LMP :  3-25-25  BCM : na   NO vaginal problems    Had unprotected sex   Pt states wants to discuss what option will be best for her   UPT: negative   Phone/Pharmacy verified:[phone]

## 2025-04-29 ENCOUNTER — GYNECOLOGY VISIT (OUTPATIENT)
Dept: OBGYN | Facility: CLINIC | Age: 20
End: 2025-04-29
Payer: COMMERCIAL

## 2025-04-29 VITALS
SYSTOLIC BLOOD PRESSURE: 102 MMHG | BODY MASS INDEX: 22.26 KG/M2 | HEIGHT: 62 IN | WEIGHT: 121 LBS | DIASTOLIC BLOOD PRESSURE: 50 MMHG

## 2025-04-29 DIAGNOSIS — Z97.5 IUD (INTRAUTERINE DEVICE) IN PLACE: ICD-10-CM

## 2025-04-29 PROBLEM — Z34.93 PRENATAL CARE, THIRD TRIMESTER: Status: RESOLVED | Noted: 2024-10-22 | Resolved: 2025-04-29

## 2025-04-29 PROBLEM — Z34.02 ENCOUNTER FOR PRENATAL CARE IN SECOND TRIMESTER OF FIRST PREGNANCY: Status: RESOLVED | Noted: 2024-06-18 | Resolved: 2025-04-29

## 2025-04-29 NOTE — PROGRESS NOTES
S: Pt here for contraceptive surveillance. She had a Mirena IUD placed on 4/1/2025. She reports no pain, minimal bleeding. She reports spotting. Overall pt is satisfied with IUD.     O: Physical Exam   Nursing note and vitals reviewed.  Constitutional: She is oriented to person, place, and time. She appears well-developed and well-nourished. No distress.     Abdominal: Soft. Bowel sounds are normal. She exhibits no distension and no mass. No tenderness. She has no rebound and no guarding.     Genitourinary:  Pelvic exam was performed with patient supine.  External genitalia with no abnormal pigmentation, labial fusion,rash, tenderness, lesion or injury to the labia bilaterally.  Vagina is moist with no lesions, foul discharge, erythema, tenderness or bleeding. No foreign body around the vagina or signs of injury.   Cervix exhibits no motion tenderness, no discharge and no friability.      IUD strings noted at external os, 1 cm in length    Neurological: She is alert and oriented to person, place, and time. She exhibits normal muscle tone.     Skin: Skin is warm and dry. No rash noted. She is not diaphoretic. No erythema. No pallor.     Psychiatric: She has a normal mood and affect. Her behavior is normal. Judgment and thought content normal.          A/P: S/P IUD placement.          Discussed irreg bleeding normal for 3-6 months following Mirena placement.          Pt reminded to have replaced or removed in 8 yrs.          Reminded of annual well woman visit.

## 2025-04-29 NOTE — PROGRESS NOTES
Pt here for IUD check   Mirena placed 4/1   Pt is still experiencing spotting, bleed precautions given

## 2025-04-30 ENCOUNTER — APPOINTMENT (OUTPATIENT)
Dept: URGENT CARE | Facility: PHYSICIAN GROUP | Age: 20
End: 2025-04-30
Payer: COMMERCIAL

## 2025-04-30 ENCOUNTER — OFFICE VISIT (OUTPATIENT)
Dept: URGENT CARE | Facility: PHYSICIAN GROUP | Age: 20
End: 2025-04-30
Payer: COMMERCIAL

## 2025-04-30 ENCOUNTER — HOSPITAL ENCOUNTER (EMERGENCY)
Facility: MEDICAL CENTER | Age: 20
End: 2025-04-30
Attending: EMERGENCY MEDICINE
Payer: COMMERCIAL

## 2025-04-30 VITALS
DIASTOLIC BLOOD PRESSURE: 64 MMHG | SYSTOLIC BLOOD PRESSURE: 98 MMHG | TEMPERATURE: 99.8 F | HEART RATE: 120 BPM | BODY MASS INDEX: 21.71 KG/M2 | WEIGHT: 118 LBS | HEIGHT: 62 IN | RESPIRATION RATE: 19 BRPM | OXYGEN SATURATION: 97 %

## 2025-04-30 VITALS
RESPIRATION RATE: 16 BRPM | TEMPERATURE: 98.7 F | DIASTOLIC BLOOD PRESSURE: 62 MMHG | BODY MASS INDEX: 21.65 KG/M2 | WEIGHT: 118.39 LBS | SYSTOLIC BLOOD PRESSURE: 115 MMHG | HEART RATE: 110 BPM | OXYGEN SATURATION: 95 %

## 2025-04-30 DIAGNOSIS — J35.1 ENLARGED TONSILS: ICD-10-CM

## 2025-04-30 DIAGNOSIS — R00.0 TACHYCARDIA: ICD-10-CM

## 2025-04-30 DIAGNOSIS — J02.0 STREP PHARYNGITIS: ICD-10-CM

## 2025-04-30 DIAGNOSIS — J02.9 PHARYNGITIS, UNSPECIFIED ETIOLOGY: ICD-10-CM

## 2025-04-30 LAB — S PYO DNA SPEC NAA+PROBE: DETECTED

## 2025-04-30 PROCEDURE — 99283 EMERGENCY DEPT VISIT LOW MDM: CPT

## 2025-04-30 PROCEDURE — 700102 HCHG RX REV CODE 250 W/ 637 OVERRIDE(OP): Mod: UD | Performed by: EMERGENCY MEDICINE

## 2025-04-30 PROCEDURE — 96372 THER/PROPH/DIAG INJ SC/IM: CPT

## 2025-04-30 PROCEDURE — 700111 HCHG RX REV CODE 636 W/ 250 OVERRIDE (IP): Mod: JZ,UD | Performed by: EMERGENCY MEDICINE

## 2025-04-30 RX ORDER — DEXAMETHASONE 4 MG/1
12 TABLET ORAL ONCE
Status: COMPLETED | OUTPATIENT
Start: 2025-04-30 | End: 2025-04-30

## 2025-04-30 RX ADMIN — DEXAMETHASONE 12 MG: 4 TABLET ORAL at 14:03

## 2025-04-30 RX ADMIN — PENICILLIN G BENZATHINE 1.2 MILLION UNITS: 2400000 INJECTION, SUSPENSION INTRAMUSCULAR at 14:04

## 2025-04-30 ASSESSMENT — ENCOUNTER SYMPTOMS
SORE THROAT: 1
DIAPHORESIS: 0
FEVER: 1
MYALGIAS: 0
PSYCHIATRIC NEGATIVE: 1
BLOOD IN STOOL: 0
VOMITING: 0
WEAKNESS: 0
COUGH: 0
WHEEZING: 0
CHILLS: 0
BLURRED VISION: 0
EYE DISCHARGE: 0
HEADACHES: 0
SINUS PAIN: 0
SPUTUM PRODUCTION: 0
DIARRHEA: 0
SHORTNESS OF BREATH: 0
DIZZINESS: 0
ABDOMINAL PAIN: 0
NAUSEA: 0

## 2025-04-30 ASSESSMENT — PAIN DESCRIPTION - PAIN TYPE: TYPE: ACUTE PAIN

## 2025-04-30 NOTE — Clinical Note
Juju Spencer was seen and treated in our emergency department on 4/30/2025.  She may return to work on 05/02/2025.       If you have any questions or concerns, please don't hesitate to call.      Ja Machuca D.O.

## 2025-04-30 NOTE — PROGRESS NOTES
"Subjective:   Juju Spencer is a 19 y.o. female who presents for Sore Throat (Swollen tonsils, hard to swollen, fever x 1 day)      HPI  Patient complains of sore throat and fever for one day    Negative: muffled voice, drooling, post nasal drip, vision changes, recent travel           Review of Systems   Constitutional:  Positive for fever. Negative for chills, diaphoresis and malaise/fatigue.   HENT:  Positive for sore throat. Negative for congestion, ear pain and sinus pain.    Eyes:  Negative for blurred vision and discharge.   Respiratory:  Negative for cough, sputum production, shortness of breath and wheezing.    Cardiovascular:  Negative for chest pain.   Gastrointestinal:  Negative for abdominal pain, blood in stool, diarrhea, nausea and vomiting.   Genitourinary: Negative.    Musculoskeletal:  Negative for myalgias.   Skin:  Negative for rash.   Neurological:  Negative for dizziness, weakness and headaches.   Endo/Heme/Allergies: Negative.    Psychiatric/Behavioral: Negative.     All other systems reviewed and are negative.      Medical History:  No past medical history on file.    Allergies:  No Known Allergies    Social history, surgical history, medications, and current problem list reviewed today in Epic.       Objective:     BP 98/64   Pulse (!) 120   Temp 37.7 °C (99.8 °F)   Resp 19   Ht 1.575 m (5' 2\")   Wt 53.5 kg (118 lb)   SpO2 97%     Physical Exam  Vitals reviewed.   Constitutional:       General: She is not in acute distress.     Appearance: Normal appearance. She is not ill-appearing, toxic-appearing or diaphoretic.   HENT:      Right Ear: External ear normal.      Left Ear: External ear normal.      Nose: Nose normal.      Mouth/Throat:      Lips: Pink.      Mouth: Mucous membranes are moist.      Tongue: Tongue does not deviate from midline.      Pharynx: No uvula swelling.      Tonsils: 3+ on the right. 4+ on the left.   Eyes:      Conjunctiva/sclera: Conjunctivae normal.      Pupils: " Pupils are equal, round, and reactive to light.   Cardiovascular:      Rate and Rhythm: Normal rate.      Pulses: Normal pulses.   Pulmonary:      Effort: Pulmonary effort is normal.   Musculoskeletal:         General: Normal range of motion.      Cervical back: Normal range of motion.   Lymphadenopathy:      Cervical: No cervical adenopathy.   Skin:     General: Skin is warm.      Capillary Refill: Capillary refill takes less than 2 seconds.   Neurological:      General: No focal deficit present.      Mental Status: She is alert and oriented to person, place, and time.   Psychiatric:         Mood and Affect: Mood normal.         Behavior: Behavior normal.         Assessment/Plan:       Diagnosis and associated orders:     1. Tachycardia        2. Pharyngitis, unspecified etiology        3. Enlarged tonsils  POCT GROUP A STREP, PCR           Comments/MDM:   I personally reviewed prior external notes and test results pertinent to today's visit as well as additional imaging and testing completed in clinic today.     Very pleasant 19-year-old afebrile, tachycardic, generally well-appearing female, presenting with complaints of enlarged tonsils and fever that started last night. Enlarged tonsils with deviated uvula, tachycardic, very firm tonsil. Patient states it is hard to swallow. I am concerned for possible tonsillar abscess. ER transfer required.   Strep swab was obtained but pending results.   Due to the diagnostic and interventional limitations of this urgent care it is recommended that the patient seek further evaluation in the emergency room.  Patient is agreeable to this plan of care.  I did offer transfer by EMS.  Patient gave an informed refusal for EMS. Patient will be traveling to the emergency department via private vehicle. Transfer center notified       Please note that this dictation was created using voice recognition software. I have made every reasonable attempt to correct obvious errors, but I  expect that there are errors of grammar and possibly content that I did not discover before finalizing the note.

## 2025-04-30 NOTE — ED TRIAGE NOTES
Chief Complaint   Patient presents with    Sent from Urgent Care     Concern for tonsillitis    Oral Swelling     Pt reports her tonsils have swollen up over last 24 hours.       B tonsils are quite swollen and red with white patches.  PT maintaining airway, but reports it is difficult to swallow pills due to size of tonsils.      Strep test started at Urgent Care.

## 2025-04-30 NOTE — ED PROVIDER NOTES
ED Provider Note    CHIEF COMPLAINT  Chief Complaint   Patient presents with    Sent from Urgent Care     Concern for tonsillitis    Oral Swelling     Pt reports her tonsils have swollen up over last 24 hours.         EXTERNAL RECORDS REVIEWED  Outpatient Notes none    HPI/ROS  LIMITATION TO HISTORY   None  OUTSIDE HISTORIAN(S):  None    Juju Spencer is a 19 y.o. female who presents here for evaluation of tonsillitis.  The patient has had increase in tonsil size over the last day.  She was seen in urgent care, and sent here for further evaluation.  Patient has no fever or chills today, but did report some fever yesterday.  She has no headache, chest pain shortness of breath or abdominal pain.    PAST MEDICAL HISTORY   No bleeding disorders    SURGICAL HISTORY  patient denies any surgical history    FAMILY HISTORY  Family History   Problem Relation Age of Onset    No Known Problems Mother     No Known Problems Father     No Known Problems Sister     No Known Problems Brother     Cancer Maternal Grandmother         colon    Lung Disease Maternal Grandfather     No Known Problems Paternal Grandmother     No Known Problems Paternal Grandfather        SOCIAL HISTORY  Social History     Tobacco Use    Smoking status: Never     Passive exposure: Yes    Smokeless tobacco: Never   Vaping Use    Vaping status: Every Day    Substances: Nicotine    Devices: Pre-filled or refillable cartridge   Substance and Sexual Activity    Alcohol use: Yes     Comment: socially    Drug use: Not Currently    Sexual activity: Yes     Partners: Male     Birth control/protection: Pill, Injection     Comment: Last dose of Depo injection in 2020 and used BC pills 2022       CURRENT MEDICATIONS  Home Medications       Reviewed by Shelby Catalan R.N. (Registered Nurse) on 04/30/25 at 1310  Med List Status: Not Addressed     Medication Last Dose Status   Prenatal MV-Min-Fe Fum-FA-DHA (PRENATAL 1 PO)  Active                    ALLERGIES  No Known  Allergies    PHYSICAL EXAM  VITAL SIGNS: /60   Pulse (!) 120   Temp 37.1 °C (98.7 °F) (Temporal)   Resp 18   Wt 53.7 kg (118 lb 6.2 oz)   SpO2 95%   BMI 21.65 kg/m²    Constitutional: Well developed, well nourished. No acute distress.  HEENT: Normocephalic, atraumatic. Posterior pharynx 2+ tonsils bilaterally with exudate  Eyes:  EOMI. Normal sclera.  Neck: Supple, Full range of motion, nontender.  No stridor  Chest/Pulmonary: clear to ausculation. Symmetrical expansion.   Abdomen: Soft, nontender. No peritoneal signs. No guarding. No palpable masses.  Musculoskeletal: No deformity, no edema, neurovascular intact.   Neuro: Clear speech, appropriate, cooperative, cranial nerves II-XII grossly intact.  Psych: Normal mood and affect      EKG/LABS  None    RADIOLOGY/PROCEDURES   None      COURSE & MEDICAL DECISION MAKING    ASSESSMENT, COURSE AND PLAN  Care Narrative: This is a 19-year-old female here for evaluation of tonsillitis.  Patient has bilateral swollen tonsils.  At this time, patient is tolerating secretions well, has no drooling or stridor.  She will be given Decadron, and Bicillin IM.  I did speak with the patient about possibility of doing a CT scan secondary to possible abscess.  She has declined at this time, and states that she would like to give the antibiotics a day or 2 to work, and she will return for any further issues or concerns.  Patient is also breast-feeding, however she states that she will give her child formula while she after taking steroids and received antibiotics.        DISPOSITION AND DISCUSSIONS  I have discussed management of the patient with the following physicians and JUAN MIGUEL's: None    Discussion of management with other QHP or appropriate source(s): None    Escalation of care considered, and ultimately not performed: None    Barriers to care at this time, including but not limited to: None.     Decision tools and prescription drugs considered including, but not limited  to: None.    FINAL DIAGNOSIS  1. Strep pharyngitis         Electronically signed by: Ja Machuca D.O., 4/30/2025 1:53 PM

## 2025-05-11 ENCOUNTER — HOSPITAL ENCOUNTER (EMERGENCY)
Facility: MEDICAL CENTER | Age: 20
End: 2025-05-11
Attending: EMERGENCY MEDICINE
Payer: COMMERCIAL

## 2025-05-11 ENCOUNTER — APPOINTMENT (OUTPATIENT)
Dept: RADIOLOGY | Facility: MEDICAL CENTER | Age: 20
End: 2025-05-11
Attending: EMERGENCY MEDICINE
Payer: COMMERCIAL

## 2025-05-11 VITALS
HEART RATE: 98 BPM | RESPIRATION RATE: 14 BRPM | OXYGEN SATURATION: 97 % | SYSTOLIC BLOOD PRESSURE: 110 MMHG | HEIGHT: 62 IN | DIASTOLIC BLOOD PRESSURE: 68 MMHG | WEIGHT: 118.3 LBS | TEMPERATURE: 98 F | BODY MASS INDEX: 21.77 KG/M2

## 2025-05-11 DIAGNOSIS — S60.221A CONTUSION OF RIGHT HAND, INITIAL ENCOUNTER: ICD-10-CM

## 2025-05-11 DIAGNOSIS — S50.811A ABRASION OF RIGHT FOREARM, INITIAL ENCOUNTER: ICD-10-CM

## 2025-05-11 PROCEDURE — 99283 EMERGENCY DEPT VISIT LOW MDM: CPT

## 2025-05-11 PROCEDURE — 73090 X-RAY EXAM OF FOREARM: CPT | Mod: RT

## 2025-05-11 PROCEDURE — 73130 X-RAY EXAM OF HAND: CPT | Mod: RT

## 2025-05-11 RX ORDER — NAPROXEN 500 MG/1
500 TABLET ORAL 2 TIMES DAILY WITH MEALS
Qty: 20 TABLET | Refills: 0 | Status: SHIPPED | OUTPATIENT
Start: 2025-05-11
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
## 2025-05-11 NOTE — ED NOTES
Discharge paperwork provided, education provided by ERP. All questions and concerns addressed by Dr. Zuniga. Pt ambulated out of ER with all belongings and steady gait.
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
## 2025-05-11 NOTE — ED NOTES
Patient ambulated to [facility] 13 with steady gait and without incident. Primary assessment complete, agree with triage note. Wrapped patient's wound temporarily with sterile gauze and saline. Chart up for ERP.